# Patient Record
Sex: MALE | Race: WHITE | NOT HISPANIC OR LATINO | Employment: OTHER | URBAN - METROPOLITAN AREA
[De-identification: names, ages, dates, MRNs, and addresses within clinical notes are randomized per-mention and may not be internally consistent; named-entity substitution may affect disease eponyms.]

---

## 2018-01-10 ENCOUNTER — OFFICE VISIT (OUTPATIENT)
Dept: CARDIOLOGY | Facility: CLINIC | Age: 83
End: 2018-01-10
Attending: INTERNAL MEDICINE
Payer: MEDICARE

## 2018-01-10 VITALS
BODY MASS INDEX: 21.76 KG/M2 | HEART RATE: 74 BPM | SYSTOLIC BLOOD PRESSURE: 121 MMHG | WEIGHT: 152 LBS | DIASTOLIC BLOOD PRESSURE: 69 MMHG | HEIGHT: 70 IN

## 2018-01-10 DIAGNOSIS — I25.10 CORONARY ARTERY DISEASE INVOLVING NATIVE CORONARY ARTERY OF NATIVE HEART WITHOUT ANGINA PECTORIS: ICD-10-CM

## 2018-01-10 DIAGNOSIS — I45.10 RIGHT BUNDLE BRANCH BLOCK: ICD-10-CM

## 2018-01-10 DIAGNOSIS — R26.9 GAIT ABNORMALITY: ICD-10-CM

## 2018-01-10 DIAGNOSIS — E78.00 HYPERCHOLESTEROLEMIA: ICD-10-CM

## 2018-01-10 DIAGNOSIS — Z95.1 HISTORY OF CORONARY ARTERY BYPASS SURGERY: ICD-10-CM

## 2018-01-10 DIAGNOSIS — I05.9 MITRAL VALVE DISEASE: ICD-10-CM

## 2018-01-10 PROCEDURE — 93000 ELECTROCARDIOGRAM COMPLETE: CPT | Mod: S$GLB,,, | Performed by: INTERNAL MEDICINE

## 2018-01-10 PROCEDURE — 99214 OFFICE O/P EST MOD 30 MIN: CPT | Mod: 25,S$GLB,, | Performed by: INTERNAL MEDICINE

## 2018-01-10 RX ORDER — ASPIRIN 81 MG/1
81 TABLET ORAL DAILY
Qty: 90 TABLET | Refills: 3 | COMMUNITY
Start: 2018-01-10 | End: 2018-07-12 | Stop reason: SDUPTHER

## 2018-01-10 RX ORDER — ATORVASTATIN CALCIUM 40 MG/1
40 TABLET, FILM COATED ORAL DAILY
Qty: 90 TABLET | Refills: 3 | Status: SHIPPED | OUTPATIENT
Start: 2018-01-10 | End: 2018-07-12 | Stop reason: SDUPTHER

## 2018-01-10 NOTE — PROGRESS NOTES
Subjective:     Chris Vallecillo Jr. is a 82 y.o. male with hypercholesterolemia. He has known coronary artery disease in that he underwent coronary artery bypass surgery in 2003 in [place] while living in [place] receiving three grafts. He was on a ACE inhibitor but that was stopped due to a low blood pressure. 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 He has developed a movement disorder and was on Levodopa that he stopped as he was begun on methylphenidate and with that his gait got a lot better. Now able to bike on the [place] again. Uses a helmet. No falls or crashes. No exertional chest pain or exertional dyspnea. No palpitations or weak spells. No bleeding. Feeling well overall.    Coronary Artery Disease   Presents for follow-up visit. The disease course has been stable. Pertinent negatives include no chest pain, chest pressure, chest tightness, dizziness, leg swelling, muscle weakness, palpitations, shortness of breath or weight gain. Risk factors include hyperlipidemia. Risk factors do not include decreased physical activity, diabetes, hypertension or obesity. The symptoms have been stable.   Hyperlipidemia   This is a chronic problem. The current episode started more than 1 year ago. The problem is controlled. Recent lipid tests were reviewed and are normal. He has no history of chronic renal disease, diabetes, hypothyroidism, liver disease, obesity or nephrotic syndrome. Pertinent negatives include no chest pain, focal sensory loss, focal weakness, leg pain, myalgias or shortness of breath.       Review of Systems   Constitution: Negative for fever and weight gain.   HENT: Negative for nosebleeds.    Eyes: Negative for pain, vision loss in left eye, vision loss in right eye and visual disturbance.   Cardiovascular: Negative for chest pain, claudication, dyspnea on exertion, irregular heartbeat, leg  swelling, near-syncope, orthopnea, palpitations, paroxysmal nocturnal dyspnea and syncope.   Respiratory: Negative for chest tightness, cough, hemoptysis, shortness of breath and wheezing.    Endocrine: Negative for cold intolerance and heat intolerance.   Hematologic/Lymphatic: Negative for bleeding problem. Does not bruise/bleed easily.   Skin: Negative for color change and rash.   Musculoskeletal: Positive for arthritis. Negative for back pain, falls, muscle weakness and myalgias.   Gastrointestinal: Negative for heartburn, hematemesis, hematochezia, hemorrhoids, jaundice, melena, nausea and vomiting.   Genitourinary: Negative for dysuria and hematuria.   Neurological: Positive for disturbances in coordination. Negative for excessive daytime sleepiness, dizziness, focal weakness, headaches, light-headedness, loss of balance, numbness and vertigo.   Psychiatric/Behavioral: Negative for altered mental status, depression and memory loss. The patient is not nervous/anxious.    Allergic/Immunologic: Negative for hives and persistent infections.       Current Outpatient Prescriptions on File Prior to Visit   Medication Sig Dispense Refill    aspirin (ECOTRIN) 81 MG EC tablet Take 1 tablet (81 mg total) by mouth once daily. 90 tablet 3    atorvastatin (LIPITOR) 40 MG tablet Take 1 tablet (40 mg total) by mouth once daily. 90 tablet 3    methylphenidate (RITALIN) 10 MG tablet Take 10 mg by mouth 2 (two) times daily.      moxifloxacin (VIGAMOX) 0.5 % ophthalmic solution Place 1 drop into the left eye 4 (four) times daily. 0.0667 mL 0    moxifloxacin (VIGAMOX) 0.5 % ophthalmic solution Place 1 drop into the right eye 4 (four) times daily. 0.0667 mL 0    penicillin v potassium (VEETID) 500 MG tablet       prednisoLONE acetate (PRED FORTE) 1 % DrpS Place 1 drop into the left eye 4 (four) times daily. 1 Bottle 0    prednisoLONE acetate (PRED FORTE) 1 % DrpS Place 1 drop into the right eye 4 (four) times daily. 1  "Bottle 1    vitamins  A,C,E-zinc-copper 14,320-226-200 unit-mg-unit Cap Take by mouth.       No current facility-administered medications on file prior to visit.        /69   Pulse 74   Ht 5' 10" (1.778 m)   Wt 68.9 kg (152 lb)   BMI 21.81 kg/m²       Objective:     Physical Exam   Constitutional: He appears well-developed and well-nourished.  Non-toxic appearance. He does not appear ill. No distress.   HENT:   Head: Normocephalic and atraumatic.   Nose: Nose normal.   Eyes: Right eye exhibits no discharge. Left eye exhibits no discharge. Right conjunctiva is not injected. Left conjunctiva is not injected. Right pupil is round. Left pupil is round. Pupils are equal.   Neck: Neck supple. No JVD present. Carotid bruit is not present. No thyroid mass and no thyromegaly present.   Cardiovascular: Normal rate, regular rhythm, S1 normal and S2 normal.   No extrasystoles are present. PMI is not displaced.  Exam reveals no gallop, no S3 and no S4.    Murmur heard.   Crescendo-decrescendo mid to late systolic murmur is present with a grade of 2/6  at the apex  Pulses:       Radial pulses are 2+ on the right side, and 2+ on the left side.        Dorsalis pedis pulses are 2+ on the right side, and 2+ on the left side.        Posterior tibial pulses are 2+ on the right side, and 2+ on the left side.   Pulmonary/Chest: Effort normal and breath sounds normal.   Abdominal: Soft. Normal appearance. There is no hepatosplenomegaly. There is no tenderness.   Musculoskeletal:        Right ankle: He exhibits no swelling, no ecchymosis and no deformity.        Left ankle: He exhibits no swelling.   Lymphadenopathy:        Head (right side): No submandibular adenopathy present.        Head (left side): No submandibular adenopathy present.     He has no cervical adenopathy.   Neurological: He is alert. He is not disoriented. No cranial nerve deficit or sensory deficit. Gait abnormal.   Skin: Skin is warm, dry and intact. No rash " noted. He is not diaphoretic. No cyanosis. Nails show no clubbing.   Psychiatric: He has a normal mood and affect. His speech is normal and behavior is normal. Judgment and thought content normal. Cognition and memory are normal.       Assessment:      1. Coronary artery disease involving native coronary artery of native heart without angina pectoris    2. History of coronary artery bypass surgery    3. Mitral valve disease    4. Right bundle branch block    5. Hypercholesterolemia    6. Gait abnormality        Plan:     1. Coronary Artery Disease   5/2003: [place]: CABG x 3.   2/2014: Episode of chest pain that sounded atypical.   On aspirin 81 mg Q24.   Stable.    2. Mitral Valve Disease   6/5/2012: Echo: Normal LV size and function. 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   6/19/2014: Echo: Mildly dilated LV with normal systolic function. Mild aortic valve sclerosis. Mild AR. Mild myxomatous mitral valve disease. Mild MR.   No need for f/u.   Well compensated.    3. Right Bundle Branch Block   3/11/2015: Noted.    4. Hypercholesterolemia   2003: Began statin.   On atorvastatin 40 mg Q24.   3/13/2013: LDL 80.   9/15/2017: Chol 169. HDL 70. LDL 90. TG 47.   On atorvastatin 40 mg Q24.   Well controlled.    5. Movement Disorder   2015: Developed gait disorder.   On methylphenidate 10 mg Q24.   Much improved.    6. Primary Care   [facility].    F/u 6 months.    Brijesh Cole M.D.

## 2018-07-12 ENCOUNTER — OFFICE VISIT (OUTPATIENT)
Dept: CARDIOLOGY | Facility: CLINIC | Age: 83
End: 2018-07-12
Attending: INTERNAL MEDICINE
Payer: MEDICARE

## 2018-07-12 VITALS
DIASTOLIC BLOOD PRESSURE: 60 MMHG | BODY MASS INDEX: 21.33 KG/M2 | HEART RATE: 76 BPM | HEIGHT: 70 IN | WEIGHT: 149 LBS | SYSTOLIC BLOOD PRESSURE: 111 MMHG

## 2018-07-12 DIAGNOSIS — E78.00 HYPERCHOLESTEROLEMIA: ICD-10-CM

## 2018-07-12 DIAGNOSIS — I25.10 CORONARY ARTERY DISEASE INVOLVING NATIVE CORONARY ARTERY OF NATIVE HEART WITHOUT ANGINA PECTORIS: ICD-10-CM

## 2018-07-12 DIAGNOSIS — R26.9 GAIT ABNORMALITY: ICD-10-CM

## 2018-07-12 DIAGNOSIS — I05.9 MITRAL VALVE DISEASE: ICD-10-CM

## 2018-07-12 DIAGNOSIS — Z95.1 HISTORY OF CORONARY ARTERY BYPASS SURGERY: ICD-10-CM

## 2018-07-12 DIAGNOSIS — I45.10 RIGHT BUNDLE BRANCH BLOCK: ICD-10-CM

## 2018-07-12 PROCEDURE — 99214 OFFICE O/P EST MOD 30 MIN: CPT | Mod: S$GLB,,, | Performed by: INTERNAL MEDICINE

## 2018-07-12 RX ORDER — ATORVASTATIN CALCIUM 40 MG/1
40 TABLET, FILM COATED ORAL DAILY
Qty: 90 TABLET | Refills: 3 | Status: SHIPPED | OUTPATIENT
Start: 2018-07-12 | End: 2019-09-03 | Stop reason: SDUPTHER

## 2018-07-12 RX ORDER — ASPIRIN 81 MG/1
81 TABLET ORAL DAILY
Qty: 90 TABLET | Refills: 3 | COMMUNITY
Start: 2018-07-12 | End: 2019-09-03 | Stop reason: SDUPTHER

## 2018-07-12 NOTE — PROGRESS NOTES
Subjective:     Chris Vallecillo Jr. is a 83 y.o. male with hypercholesterolemia. He has known coronary artery disease in that he underwent coronary artery bypass surgery in 2003 in [place] while living in [place] receiving three grafts. He was on a ACE inhibitor but that was stopped due to a low blood pressure. 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 He has developed a movement disorder and was on Levodopa that he stopped as he was begun on methylphenidate and with that his gait got a lot better. No longer able to bike. No falls. No exertional chest pain or exertional dyspnea. No palpitations or weak spells. No bleeding. Feeling well overall.    Coronary Artery Disease   Presents for follow-up visit. The disease course has been stable. Pertinent negatives include no chest pain, chest pressure, chest tightness, dizziness, leg swelling, muscle weakness, palpitations, shortness of breath or weight gain. Risk factors include hyperlipidemia. Risk factors do not include decreased physical activity, diabetes, hypertension or obesity. The symptoms have been stable.   Hyperlipidemia   This is a chronic problem. The current episode started more than 1 year ago. The problem is controlled. Recent lipid tests were reviewed and are normal. He has no history of chronic renal disease, diabetes, hypothyroidism, liver disease, obesity or nephrotic syndrome. Pertinent negatives include no chest pain, focal sensory loss, focal weakness, leg pain, myalgias or shortness of breath.       Review of Systems   Constitution: Negative for fever and weight gain.   HENT: Negative for nosebleeds.    Eyes: Negative for pain, vision loss in left eye and vision loss in right eye.   Cardiovascular: Negative for chest pain, claudication, dyspnea on exertion, irregular heartbeat, leg swelling, near-syncope, orthopnea, palpitations,  paroxysmal nocturnal dyspnea and syncope.   Respiratory: Negative for chest tightness, cough, hemoptysis, shortness of breath and wheezing.    Endocrine: Negative for cold intolerance and heat intolerance.   Hematologic/Lymphatic: Negative for bleeding problem. Does not bruise/bleed easily.   Skin: Negative for color change and rash.   Musculoskeletal: Positive for arthritis. Negative for back pain, falls, muscle weakness and myalgias.   Gastrointestinal: Negative for heartburn, hematemesis, hematochezia, hemorrhoids, jaundice, melena, nausea and vomiting.   Genitourinary: Negative for dysuria and hematuria.   Neurological: Positive for disturbances in coordination. Negative for excessive daytime sleepiness, dizziness, focal weakness, headaches, light-headedness, loss of balance, numbness and vertigo.   Psychiatric/Behavioral: Negative for altered mental status, depression and memory loss. The patient is not nervous/anxious.    Allergic/Immunologic: Negative for hives and persistent infections.       Current Outpatient Prescriptions on File Prior to Visit   Medication Sig Dispense Refill    aspirin (ECOTRIN) 81 MG EC tablet Take 1 tablet (81 mg total) by mouth once daily. 90 tablet 3    atorvastatin (LIPITOR) 40 MG tablet Take 1 tablet (40 mg total) by mouth once daily. 90 tablet 3    methylphenidate (RITALIN) 10 MG tablet Take 10 mg by mouth 2 (two) times daily.      moxifloxacin (VIGAMOX) 0.5 % ophthalmic solution Place 1 drop into the left eye 4 (four) times daily. 0.0667 mL 0    moxifloxacin (VIGAMOX) 0.5 % ophthalmic solution Place 1 drop into the right eye 4 (four) times daily. 0.0667 mL 0    penicillin v potassium (VEETID) 500 MG tablet       prednisoLONE acetate (PRED FORTE) 1 % DrpS Place 1 drop into the left eye 4 (four) times daily. 1 Bottle 0    prednisoLONE acetate (PRED FORTE) 1 % DrpS Place 1 drop into the right eye 4 (four) times daily. 1 Bottle 1    vitamins  A,C,E-zinc-copper  "14,320-226-200 unit-mg-unit Cap Take by mouth.       No current facility-administered medications on file prior to visit.        /60   Pulse 76   Ht 5' 10" (1.778 m)   Wt 67.6 kg (149 lb)   BMI 21.38 kg/m²       Objective:     Physical Exam   Constitutional: He appears well-developed and well-nourished.  Non-toxic appearance. He does not appear ill. No distress.   HENT:   Head: Normocephalic and atraumatic.   Nose: Nose normal.   Eyes: Right eye exhibits no discharge. Left eye exhibits no discharge. Right conjunctiva is not injected. Left conjunctiva is not injected. Right pupil is round. Left pupil is round. Pupils are equal.   Neck: Neck supple. No JVD present. Carotid bruit is not present. No thyroid mass and no thyromegaly present.   Cardiovascular: Normal rate, regular rhythm, S1 normal and S2 normal.   No extrasystoles are present. PMI is not displaced.  Exam reveals no gallop, no S3 and no S4.    Murmur heard.  High-pitched blowing holosystolic murmur is present with a grade of 2/6  at the apex  Pulses:       Radial pulses are 2+ on the right side, and 2+ on the left side.        Dorsalis pedis pulses are 2+ on the right side, and 2+ on the left side.        Posterior tibial pulses are 2+ on the right side, and 2+ on the left side.   Pulmonary/Chest: Effort normal and breath sounds normal.   Abdominal: Soft. Normal appearance. There is no hepatosplenomegaly. There is no tenderness.   Musculoskeletal:        Right ankle: He exhibits no swelling, no ecchymosis and no deformity.        Left ankle: He exhibits no swelling.   Lymphadenopathy:        Head (right side): No submandibular adenopathy present.        Head (left side): No submandibular adenopathy present.     He has no cervical adenopathy.   Neurological: He is alert. He is not disoriented. No cranial nerve deficit. Gait abnormal.   Skin: Skin is warm, dry and intact. No rash noted. He is not diaphoretic. Nails show no clubbing.   Psychiatric: " He has a normal mood and affect. His speech is normal and behavior is normal. Judgment and thought content normal. Cognition and memory are normal.       Assessment:      1. Coronary artery disease involving native coronary artery of native heart without angina pectoris    2. History of coronary artery bypass surgery    3. Mitral valve disease    4. Right bundle branch block    5. Hypercholesterolemia    6. Gait abnormality        Plan:     1. Coronary Artery Disease   5/2003: [place]: CABG x 3.   2/2014: Episode of chest pain that sounded atypical.   On aspirin 81 mg Q24.   Stable.    2. Mitral Valve Disease   6/5/2012: Echo: Normal LV size and function. 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   6/19/2014: Echo: Mildly dilated LV with normal systolic function. Mild aortic valve sclerosis. Mild AR. Mild myxomatous mitral valve disease. Mild MR.   Well compensated.   No need for f/u.    3. Right Bundle Branch Block   3/11/2015: Noted.    4. Hypercholesterolemia   2003: Began statin.   On atorvastatin 40 mg Q24.   3/13/2013: LDL 80.   9/15/2017: Chol 169. HDL 70. LDL 90. TG 47.   On atorvastatin 40 mg Q24.   Well controlled.    5. Movement Disorder   2015: Developed gait disorder.   On methylphenidate 10 mg Q24.   Much improved.    6. Primary Care   [facility].    F/u 6 months.    Brijesh Cole M.D.

## 2018-11-27 ENCOUNTER — TELEPHONE (OUTPATIENT)
Dept: ORTHOPEDICS | Facility: CLINIC | Age: 83
End: 2018-11-27

## 2018-11-27 NOTE — TELEPHONE ENCOUNTER
Spoke to pt, informed he scheduled an appointment with Dr. Retana for toe overlap. Dr. Retana does not see feet, only knees and hips. Pt states he thinks his wife made the appointment. He states he is seeing his neurologist next tues and will be more coherent after the appointment. Informed pt I will cancel the appointment with Dr. Retana. Pt verbalized understanding. Informed pt I can help him schedule with another provider, pt declines stating he does not need an appointment at this time. Informed pt to call in the future if needed. Pt verbalized understanding

## 2019-01-10 ENCOUNTER — OFFICE VISIT (OUTPATIENT)
Dept: CARDIOLOGY | Facility: CLINIC | Age: 84
End: 2019-01-10
Attending: INTERNAL MEDICINE
Payer: MEDICARE

## 2019-01-10 VITALS
WEIGHT: 147 LBS | BODY MASS INDEX: 23.07 KG/M2 | SYSTOLIC BLOOD PRESSURE: 117 MMHG | HEART RATE: 73 BPM | HEIGHT: 67 IN | DIASTOLIC BLOOD PRESSURE: 59 MMHG

## 2019-01-10 DIAGNOSIS — I05.9 MITRAL VALVE DISEASE: ICD-10-CM

## 2019-01-10 DIAGNOSIS — Z95.1 HISTORY OF CORONARY ARTERY BYPASS SURGERY: ICD-10-CM

## 2019-01-10 DIAGNOSIS — R26.9 GAIT ABNORMALITY: ICD-10-CM

## 2019-01-10 DIAGNOSIS — E78.00 HYPERCHOLESTEROLEMIA: ICD-10-CM

## 2019-01-10 DIAGNOSIS — I25.10 CORONARY ARTERY DISEASE INVOLVING NATIVE CORONARY ARTERY OF NATIVE HEART WITHOUT ANGINA PECTORIS: ICD-10-CM

## 2019-01-10 DIAGNOSIS — I45.10 RIGHT BUNDLE BRANCH BLOCK: ICD-10-CM

## 2019-01-10 PROCEDURE — 99214 OFFICE O/P EST MOD 30 MIN: CPT | Mod: S$GLB,,, | Performed by: INTERNAL MEDICINE

## 2019-01-10 PROCEDURE — 99214 PR OFFICE/OUTPT VISIT, EST, LEVL IV, 30-39 MIN: ICD-10-PCS | Mod: S$GLB,,, | Performed by: INTERNAL MEDICINE

## 2019-01-10 RX ORDER — LEVETIRACETAM 250 MG/1
250 TABLET ORAL 2 TIMES DAILY
COMMUNITY

## 2019-01-10 NOTE — PROGRESS NOTES
Subjective:     Chris Vallecillo Jr. is a 83 y.o. male with hypercholesterolemia. He has known coronary artery disease in that he underwent coronary artery bypass surgery in 2003 in [place] while living in [place] receiving three grafts. He was on a ACE inhibitor but that was stopped due to a low blood pressure. 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 He has developed a movement disorder and was on Levodopa that he stopped as he was begun on methylphenidate and with that his gait got a lot better. Still able to bike although his family is trying to get him to walk instead. No falls. No exertional chest pain or exertional dyspnea. No palpitations or weak spells. No bleeding. Feeling well overall.      Coronary Artery Disease   Presents for follow-up visit. The disease course has been stable. Pertinent negatives include no chest pain, chest pressure, chest tightness, dizziness, leg swelling, muscle weakness, palpitations, shortness of breath or weight gain. Risk factors include hyperlipidemia. Risk factors do not include decreased physical activity, diabetes, hypertension or obesity. The symptoms have been stable.   Hyperlipidemia   This is a chronic problem. The current episode started more than 1 year ago. The problem is controlled. Recent lipid tests were reviewed and are normal. He has no history of chronic renal disease, diabetes, hypothyroidism, liver disease, obesity or nephrotic syndrome. Pertinent negatives include no chest pain, focal sensory loss, focal weakness, leg pain, myalgias or shortness of breath.       Review of Systems   Constitution: Negative for fever and weight gain.   HENT: Negative for nosebleeds.    Eyes: Negative for pain, vision loss in left eye and vision loss in right eye.   Cardiovascular: Negative for chest pain, claudication, dyspnea on exertion, irregular heartbeat, leg  swelling, near-syncope, orthopnea, palpitations, paroxysmal nocturnal dyspnea and syncope.   Respiratory: Negative for chest tightness, cough, hemoptysis, shortness of breath and wheezing.    Endocrine: Negative for cold intolerance and heat intolerance.   Hematologic/Lymphatic: Negative for bleeding problem. Does not bruise/bleed easily.   Skin: Negative for color change and rash.   Musculoskeletal: Positive for arthritis. Negative for back pain, falls, muscle weakness and myalgias.   Gastrointestinal: Negative for heartburn, hematemesis, hematochezia, hemorrhoids, jaundice, melena, nausea and vomiting.   Genitourinary: Negative for dysuria and hematuria.   Neurological: Positive for disturbances in coordination. Negative for excessive daytime sleepiness, dizziness, focal weakness, headaches, light-headedness, loss of balance, numbness and vertigo.   Psychiatric/Behavioral: Negative for altered mental status, depression and memory loss. The patient is not nervous/anxious.    Allergic/Immunologic: Negative for hives and persistent infections.       Current Outpatient Medications on File Prior to Visit   Medication Sig Dispense Refill    aspirin (ECOTRIN) 81 MG EC tablet Take 1 tablet (81 mg total) by mouth once daily. 90 tablet 3    atorvastatin (LIPITOR) 40 MG tablet Take 1 tablet (40 mg total) by mouth once daily. 90 tablet 3    levETIRAcetam (KEPPRA) 250 MG Tab Take 250 mg by mouth 2 (two) times daily.      methylphenidate (RITALIN) 10 MG tablet Take 10 mg by mouth 2 (two) times daily.      moxifloxacin (VIGAMOX) 0.5 % ophthalmic solution Place 1 drop into the left eye 4 (four) times daily. 0.0667 mL 0    moxifloxacin (VIGAMOX) 0.5 % ophthalmic solution Place 1 drop into the right eye 4 (four) times daily. 0.0667 mL 0    penicillin v potassium (VEETID) 500 MG tablet       prednisoLONE acetate (PRED FORTE) 1 % DrpS Place 1 drop into the left eye 4 (four) times daily. 1 Bottle 0    prednisoLONE acetate  "(PRED FORTE) 1 % DrpS Place 1 drop into the right eye 4 (four) times daily. 1 Bottle 1    vitamins  A,C,E-zinc-copper 14,320-226-200 unit-mg-unit Cap Take by mouth.       No current facility-administered medications on file prior to visit.        BP (!) 117/59   Pulse 73   Ht 5' 7" (1.702 m)   Wt 66.7 kg (147 lb)   BMI 23.02 kg/m²       Objective:     Physical Exam   Constitutional: He appears well-developed and well-nourished.  Non-toxic appearance. He does not appear ill. No distress.   HENT:   Head: Normocephalic and atraumatic.   Nose: Nose normal.   Eyes: Right eye exhibits no discharge. Left eye exhibits no discharge. Right conjunctiva is not injected. Left conjunctiva is not injected. Right pupil is round. Left pupil is round. Pupils are equal.   Neck: Neck supple. No JVD present. Carotid bruit is not present. No thyroid mass and no thyromegaly present.   Cardiovascular: Normal rate, regular rhythm, S1 normal and S2 normal.  No extrasystoles are present. PMI is not displaced. Exam reveals no gallop, no S3 and no S4.   Murmur heard.  High-pitched blowing holosystolic murmur is present with a grade of 2/6 at the apex.  Pulses:       Radial pulses are 2+ on the right side, and 2+ on the left side.        Dorsalis pedis pulses are 2+ on the right side, and 2+ on the left side.        Posterior tibial pulses are 2+ on the right side, and 2+ on the left side.   Pulmonary/Chest: Effort normal and breath sounds normal.   Median sternotomy scar.   Abdominal: Soft. Normal appearance. There is no hepatosplenomegaly. There is no tenderness.   Musculoskeletal:        Right ankle: He exhibits no swelling, no ecchymosis and no deformity.        Left ankle: He exhibits no swelling.   Lymphadenopathy:        Head (right side): No submandibular adenopathy present.        Head (left side): No submandibular adenopathy present.     He has no cervical adenopathy.   Neurological: He is alert. He is not disoriented. No cranial " nerve deficit. Gait abnormal.   Skin: Skin is warm, dry and intact. No rash noted. He is not diaphoretic.   Psychiatric: He has a normal mood and affect. His speech is normal and behavior is normal. Judgment and thought content normal. Cognition and memory are normal.       Assessment:      1. Coronary artery disease involving native coronary artery of native heart without angina pectoris    2. History of coronary artery bypass surgery    3. Mitral valve disease    4. Right bundle branch block    5. Hypercholesterolemia    6. Gait abnormality        Plan:     1. Coronary Artery Disease   5/2003: [place]: CABG x 3.   2/2014: Episode of chest pain that sounded atypical.   On aspirin 81 mg Q24.   Stable.    2. Mitral Valve Disease   6/5/2012: Echo: Normal LV size and function. 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   6/19/2014: Echo: Mildly dilated LV with normal systolic function. Mild aortic valve sclerosis. Mild AR. Mild myxomatous mitral valve disease. Mild MR.   Well compensated.   No need for f/u.    3. Right Bundle Branch Block   3/11/2015: Noted.    4. Hypercholesterolemia   2003: Began statin.   On atorvastatin 40 mg Q24.   3/13/2013: LDL 80.   9/15/2017: Chol 169. HDL 70. LDL 90. TG 47.   On atorvastatin 40 mg Q24.   Well controlled.    5. Movement Disorder   2015: Developed gait disorder.   On methylphenidate 10 mg Q24 and levetiracetam 250 mg Q12.   Much improved.    6. Primary Care   [facility].    F/u 6 months.    Brijesh Cole M.D.

## 2019-09-03 ENCOUNTER — OFFICE VISIT (OUTPATIENT)
Dept: CARDIOLOGY | Facility: CLINIC | Age: 84
End: 2019-09-03
Attending: INTERNAL MEDICINE
Payer: MEDICARE

## 2019-09-03 VITALS
HEART RATE: 79 BPM | BODY MASS INDEX: 22.44 KG/M2 | WEIGHT: 143 LBS | DIASTOLIC BLOOD PRESSURE: 65 MMHG | HEIGHT: 67 IN | SYSTOLIC BLOOD PRESSURE: 115 MMHG

## 2019-09-03 DIAGNOSIS — I45.10 RIGHT BUNDLE BRANCH BLOCK: ICD-10-CM

## 2019-09-03 DIAGNOSIS — Z95.1 HISTORY OF CORONARY ARTERY BYPASS SURGERY: ICD-10-CM

## 2019-09-03 DIAGNOSIS — R26.9 GAIT ABNORMALITY: ICD-10-CM

## 2019-09-03 DIAGNOSIS — I05.9 MITRAL VALVE DISEASE: ICD-10-CM

## 2019-09-03 DIAGNOSIS — I25.10 CORONARY ARTERY DISEASE INVOLVING NATIVE CORONARY ARTERY OF NATIVE HEART WITHOUT ANGINA PECTORIS: ICD-10-CM

## 2019-09-03 DIAGNOSIS — E78.00 HYPERCHOLESTEROLEMIA: ICD-10-CM

## 2019-09-03 PROCEDURE — 99214 OFFICE O/P EST MOD 30 MIN: CPT | Mod: S$GLB,,, | Performed by: INTERNAL MEDICINE

## 2019-09-03 PROCEDURE — 99214 PR OFFICE/OUTPT VISIT, EST, LEVL IV, 30-39 MIN: ICD-10-PCS | Mod: S$GLB,,, | Performed by: INTERNAL MEDICINE

## 2019-09-03 RX ORDER — ASPIRIN 81 MG/1
81 TABLET ORAL DAILY
Qty: 90 TABLET | Refills: 3 | COMMUNITY
Start: 2019-09-03 | End: 2020-03-03 | Stop reason: SDUPTHER

## 2019-09-03 RX ORDER — ATORVASTATIN CALCIUM 40 MG/1
40 TABLET, FILM COATED ORAL DAILY
Qty: 90 TABLET | Refills: 3 | Status: SHIPPED | OUTPATIENT
Start: 2019-09-03 | End: 2020-01-09

## 2019-09-03 NOTE — PROGRESS NOTES
Subjective:     Chris Vallecillo Jr. is a 84 y.o. male with hypercholesterolemia. He has known coronary artery disease in that he underwent coronary artery bypass surgery in 2003 in [place] while living in [place] receiving three grafts. He was on a ACE inhibitor but that was stopped due to a low blood pressure. 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 He has developed a movement disorder and was on Levodopa that he stopped as he was begun on methylphenidate and with that his gait got better. Still able to bike although his family is trying to get him to walk instead. No falls. No exertional chest pain or exertional dyspnea. No palpitations or weak spells. No bleeding. Feeling well overall.      Coronary Artery Disease   Presents for follow-up visit. The disease course has been stable. Pertinent negatives include no chest pain, chest pressure, chest tightness, dizziness, leg swelling, muscle weakness, palpitations, shortness of breath or weight gain. Risk factors include hyperlipidemia. Risk factors do not include decreased physical activity, diabetes, hypertension or obesity. The symptoms have been stable.   Hyperlipidemia   This is a chronic problem. The current episode started more than 1 year ago. The problem is controlled. Recent lipid tests were reviewed and are normal. He has no history of chronic renal disease, diabetes, hypothyroidism, liver disease, obesity or nephrotic syndrome. Pertinent negatives include no chest pain, focal sensory loss, focal weakness, leg pain, myalgias or shortness of breath.       Review of Systems   Constitution: Negative for fever and weight gain.   HENT: Negative for nosebleeds.    Eyes: Negative for pain, vision loss in left eye and vision loss in right eye.   Cardiovascular: Negative for chest pain, claudication, dyspnea on exertion, irregular heartbeat, leg swelling,  near-syncope, orthopnea, palpitations, paroxysmal nocturnal dyspnea and syncope.   Respiratory: Negative for chest tightness, cough, hemoptysis, shortness of breath and wheezing.    Endocrine: Negative for cold intolerance and heat intolerance.   Hematologic/Lymphatic: Negative for bleeding problem. Does not bruise/bleed easily.   Skin: Negative for color change and rash.   Musculoskeletal: Negative for arthritis, back pain, falls, muscle weakness and myalgias.   Gastrointestinal: Negative for heartburn, hematemesis, hematochezia, hemorrhoids, jaundice, melena, nausea and vomiting.   Genitourinary: Negative for dysuria and hematuria.   Neurological: Positive for disturbances in coordination. Negative for excessive daytime sleepiness, dizziness, focal weakness, headaches, light-headedness, loss of balance, numbness and vertigo.   Psychiatric/Behavioral: Negative for altered mental status, depression and memory loss. The patient is not nervous/anxious.    Allergic/Immunologic: Negative for hives and persistent infections.       Current Outpatient Medications on File Prior to Visit   Medication Sig Dispense Refill    aspirin (ECOTRIN) 81 MG EC tablet Take 1 tablet (81 mg total) by mouth once daily. 90 tablet 3    atorvastatin (LIPITOR) 40 MG tablet Take 1 tablet (40 mg total) by mouth once daily. 90 tablet 3    levETIRAcetam (KEPPRA) 250 MG Tab Take 250 mg by mouth 2 (two) times daily.      methylphenidate (RITALIN) 10 MG tablet Take 10 mg by mouth 2 (two) times daily.      moxifloxacin (VIGAMOX) 0.5 % ophthalmic solution Place 1 drop into the left eye 4 (four) times daily. 0.0667 mL 0    moxifloxacin (VIGAMOX) 0.5 % ophthalmic solution Place 1 drop into the right eye 4 (four) times daily. 0.0667 mL 0    penicillin v potassium (VEETID) 500 MG tablet       prednisoLONE acetate (PRED FORTE) 1 % DrpS Place 1 drop into the left eye 4 (four) times daily. 1 Bottle 0    prednisoLONE acetate (PRED FORTE) 1 % DrpS  "Place 1 drop into the right eye 4 (four) times daily. 1 Bottle 1    vitamins  A,C,E-zinc-copper 14,320-226-200 unit-mg-unit Cap Take by mouth.       No current facility-administered medications on file prior to visit.        /65   Pulse 79   Ht 5' 7" (1.702 m)   Wt 64.9 kg (143 lb)   BMI 22.40 kg/m²       Objective:     Physical Exam   Constitutional: He appears well-developed and well-nourished.  Non-toxic appearance. He does not appear ill. No distress.   HENT:   Head: Normocephalic and atraumatic.   Nose: Nose normal.   Eyes: Right eye exhibits no discharge. Left eye exhibits no discharge. Right conjunctiva is not injected. Left conjunctiva is not injected. Right pupil is round. Left pupil is round. Pupils are equal.   Neck: Neck supple. No JVD present. Carotid bruit is not present. No thyroid mass and no thyromegaly present.   Cardiovascular: Normal rate, regular rhythm, S1 normal and S2 normal.  No extrasystoles are present. PMI is not displaced. Exam reveals no gallop, no S3 and no S4.   Murmur heard.  High-pitched blowing holosystolic murmur is present with a grade of 3/6 at the apex.  Pulses:       Radial pulses are 2+ on the right side, and 2+ on the left side.        Dorsalis pedis pulses are 2+ on the right side, and 2+ on the left side.        Posterior tibial pulses are 2+ on the right side, and 2+ on the left side.   Pulmonary/Chest: Effort normal and breath sounds normal.   Median sternotomy scar.   Abdominal: Soft. Normal appearance. There is no hepatosplenomegaly. There is no tenderness.   Musculoskeletal:        Right ankle: He exhibits no swelling, no ecchymosis and no deformity.        Left ankle: He exhibits no swelling.   Lymphadenopathy:        Head (right side): No submandibular adenopathy present.        Head (left side): No submandibular adenopathy present.     He has no cervical adenopathy.   Neurological: He is alert. He is not disoriented. No cranial nerve deficit. Gait " abnormal.   Skin: Skin is warm, dry and intact. No rash noted. He is not diaphoretic.   Psychiatric: He has a normal mood and affect. His speech is normal and behavior is normal. Judgment and thought content normal. Cognition and memory are normal.       Assessment:      1. Coronary artery disease involving native coronary artery of native heart without angina pectoris    2. History of coronary artery bypass surgery    3. Mitral valve disease    4. Right bundle branch block    5. Hypercholesterolemia    6. Gait abnormality        Plan:     1. Coronary Artery Disease   5/2003: [place]: CABG x 3.   2/2014: Episode of chest pain that sounded atypical.   On aspirin 81 mg Q24.   Stable.    2. Mitral Valve Disease   6/5/2012: Echo: Normal LV size and function. 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   6/19/2014: Echo: Mildly dilated LV with normal systolic function. Mild aortic valve sclerosis. Mild AR. Mild myxomatous mitral valve disease. Mild MR.   Well compensated.   No need for f/u.    3. Right Bundle Branch Block   3/11/2015: Noted.    4. Hypercholesterolemia   2003: Began statin.   On atorvastatin 40 mg Q24.   3/13/2013: LDL 80.   9/15/2017: Chol 169. HDL 70. LDL 90. TG 47.   On atorvastatin 40 mg Q24.   Well controlled.    5. Movement Disorder   2015: Developed gait disorder.   On methylphenidate 10 mg Q24 and levetiracetam 250 mg Q12.   Much improved.    6. Primary Care   [facility].    F/u 6 months.    Brijesh Cole M.D.

## 2020-01-09 DIAGNOSIS — I25.10 CORONARY ARTERY DISEASE INVOLVING NATIVE CORONARY ARTERY OF NATIVE HEART WITHOUT ANGINA PECTORIS: ICD-10-CM

## 2020-01-09 DIAGNOSIS — E78.00 HYPERCHOLESTEROLEMIA: ICD-10-CM

## 2020-01-09 RX ORDER — ATORVASTATIN CALCIUM 40 MG/1
TABLET, FILM COATED ORAL
Qty: 30 TABLET | Refills: 11 | Status: SHIPPED | OUTPATIENT
Start: 2020-01-09 | End: 2020-03-03 | Stop reason: SDUPTHER

## 2020-03-03 ENCOUNTER — OFFICE VISIT (OUTPATIENT)
Dept: CARDIOLOGY | Facility: CLINIC | Age: 85
End: 2020-03-03
Attending: INTERNAL MEDICINE
Payer: MEDICARE

## 2020-03-03 VITALS
HEIGHT: 67 IN | WEIGHT: 145 LBS | HEART RATE: 85 BPM | SYSTOLIC BLOOD PRESSURE: 102 MMHG | BODY MASS INDEX: 22.76 KG/M2 | DIASTOLIC BLOOD PRESSURE: 53 MMHG

## 2020-03-03 DIAGNOSIS — I05.9 MITRAL VALVE DISEASE: ICD-10-CM

## 2020-03-03 DIAGNOSIS — I45.10 RIGHT BUNDLE BRANCH BLOCK: ICD-10-CM

## 2020-03-03 DIAGNOSIS — I25.10 CORONARY ARTERY DISEASE INVOLVING NATIVE CORONARY ARTERY OF NATIVE HEART WITHOUT ANGINA PECTORIS: ICD-10-CM

## 2020-03-03 DIAGNOSIS — R26.9 GAIT ABNORMALITY: ICD-10-CM

## 2020-03-03 DIAGNOSIS — E78.00 HYPERCHOLESTEROLEMIA: ICD-10-CM

## 2020-03-03 DIAGNOSIS — Z95.1 HISTORY OF CORONARY ARTERY BYPASS SURGERY: ICD-10-CM

## 2020-03-03 PROCEDURE — 93000 ELECTROCARDIOGRAM COMPLETE: CPT | Mod: S$GLB,,, | Performed by: INTERNAL MEDICINE

## 2020-03-03 PROCEDURE — 99214 OFFICE O/P EST MOD 30 MIN: CPT | Mod: 25,S$GLB,, | Performed by: INTERNAL MEDICINE

## 2020-03-03 PROCEDURE — 93000 PR ELECTROCARDIOGRAM, COMPLETE: ICD-10-PCS | Mod: S$GLB,,, | Performed by: INTERNAL MEDICINE

## 2020-03-03 PROCEDURE — 99214 PR OFFICE/OUTPT VISIT, EST, LEVL IV, 30-39 MIN: ICD-10-PCS | Mod: 25,S$GLB,, | Performed by: INTERNAL MEDICINE

## 2020-03-03 RX ORDER — ASPIRIN 81 MG/1
81 TABLET ORAL DAILY
Qty: 90 TABLET | Refills: 3 | Status: SHIPPED | OUTPATIENT
Start: 2020-03-03 | End: 2020-09-03 | Stop reason: SDUPTHER

## 2020-03-03 RX ORDER — ATORVASTATIN CALCIUM 40 MG/1
40 TABLET, FILM COATED ORAL DAILY
Qty: 90 TABLET | Refills: 3 | Status: SHIPPED | OUTPATIENT
Start: 2020-03-03 | End: 2020-03-06

## 2020-03-03 NOTE — PROGRESS NOTES
Subjective:     Chris Vallecillo Jr. is a 85 y.o. male with hypercholesterolemia. He has known coronary artery disease in that he underwent coronary artery bypass surgery in 2003 in [place] while living in [place] receiving three grafts. He was on a ACE inhibitor but that was stopped due to a low blood pressure. 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 He has developed a movement disorder and was on Levodopa that he stopped as he was begun on methylphenidate and with that his gait got better. He was able to bike until 2019 but since then been walking. No falls. No exertional chest pain or exertional dyspnea. No palpitations or weak spells. No bleeding. Feeling well overall.      Coronary Artery Disease   Presents for follow-up visit. The disease course has been stable. Pertinent negatives include no chest pain, chest pressure, chest tightness, dizziness, leg swelling, muscle weakness, palpitations, shortness of breath or weight gain. Risk factors include hyperlipidemia. Risk factors do not include decreased physical activity, diabetes, hypertension or obesity. The symptoms have been stable.   Hyperlipidemia   This is a chronic problem. The current episode started more than 1 year ago. The problem is controlled. Recent lipid tests were reviewed and are normal. He has no history of chronic renal disease, diabetes, hypothyroidism, liver disease, obesity or nephrotic syndrome. Pertinent negatives include no chest pain, focal sensory loss, focal weakness, leg pain, myalgias or shortness of breath.       Review of Systems   Constitution: Negative for fever and weight gain.   HENT: Negative for nosebleeds.    Eyes: Negative for pain, vision loss in left eye and vision loss in right eye.   Cardiovascular: Negative for chest pain, claudication, dyspnea on exertion, irregular heartbeat, leg swelling, near-syncope,  orthopnea, palpitations, paroxysmal nocturnal dyspnea and syncope.   Respiratory: Negative for chest tightness, cough, hemoptysis, shortness of breath and wheezing.    Endocrine: Negative for cold intolerance and heat intolerance.   Hematologic/Lymphatic: Negative for bleeding problem. Does not bruise/bleed easily.   Skin: Negative for color change and rash.   Musculoskeletal: Negative for arthritis, back pain, falls, muscle weakness and myalgias.   Gastrointestinal: Negative for heartburn, hematemesis, hematochezia, hemorrhoids, jaundice, melena, nausea and vomiting.   Genitourinary: Negative for dysuria and hematuria.   Neurological: Positive for disturbances in coordination. Negative for excessive daytime sleepiness, dizziness, focal weakness, headaches, light-headedness, loss of balance, numbness and vertigo.   Psychiatric/Behavioral: Negative for altered mental status, depression and memory loss. The patient is not nervous/anxious.    Allergic/Immunologic: Negative for hives and persistent infections.       Current Outpatient Medications on File Prior to Visit   Medication Sig Dispense Refill    aspirin (ECOTRIN) 81 MG EC tablet Take 1 tablet (81 mg total) by mouth once daily. 90 tablet 3    atorvastatin (LIPITOR) 40 MG tablet TAKE 1 TABLET BY MOUTH EVERY DAY 30 tablet 11    levETIRAcetam (KEPPRA) 250 MG Tab Take 250 mg by mouth 2 (two) times daily.      methylphenidate (RITALIN) 10 MG tablet Take 10 mg by mouth 2 (two) times daily.      moxifloxacin (VIGAMOX) 0.5 % ophthalmic solution Place 1 drop into the left eye 4 (four) times daily. 0.0667 mL 0    moxifloxacin (VIGAMOX) 0.5 % ophthalmic solution Place 1 drop into the right eye 4 (four) times daily. 0.0667 mL 0    penicillin v potassium (VEETID) 500 MG tablet       prednisoLONE acetate (PRED FORTE) 1 % DrpS Place 1 drop into the left eye 4 (four) times daily. 1 Bottle 0    prednisoLONE acetate (PRED FORTE) 1 % DrpS Place 1 drop into the right eye 4  "(four) times daily. 1 Bottle 1    vitamins  A,C,E-zinc-copper 14,320-226-200 unit-mg-unit Cap Take by mouth.       No current facility-administered medications on file prior to visit.        BP (!) 102/53   Pulse 85   Ht 5' 7" (1.702 m)   Wt 65.8 kg (145 lb)   BMI 22.71 kg/m²       Objective:     Physical Exam   Constitutional: He appears well-developed and well-nourished.  Non-toxic appearance. He does not appear ill. No distress.   HENT:   Head: Normocephalic and atraumatic.   Nose: Nose normal.   Eyes: Right eye exhibits no discharge. Left eye exhibits no discharge. Right conjunctiva is not injected. Left conjunctiva is not injected. Right pupil is round. Left pupil is round. Pupils are equal.   Neck: Neck supple. No JVD present. Carotid bruit is not present. No thyroid mass and no thyromegaly present.   Cardiovascular: Normal rate, regular rhythm, S1 normal and S2 normal.  No extrasystoles are present. PMI is not displaced. Exam reveals no gallop, no S3 and no S4.   Murmur heard.  High-pitched blowing holosystolic murmur is present with a grade of 3/6 at the apex.  Pulses:       Radial pulses are 2+ on the right side, and 2+ on the left side.        Dorsalis pedis pulses are 2+ on the right side, and 2+ on the left side.        Posterior tibial pulses are 2+ on the right side, and 2+ on the left side.   Pulmonary/Chest: Effort normal and breath sounds normal.   Median sternotomy scar.   Abdominal: Soft. Normal appearance. There is no hepatosplenomegaly. There is no tenderness.   Musculoskeletal:        Right ankle: He exhibits no swelling, no ecchymosis and no deformity.        Left ankle: He exhibits no swelling.   Lymphadenopathy:        Head (right side): No submandibular adenopathy present.        Head (left side): No submandibular adenopathy present.     He has no cervical adenopathy.   Neurological: He is alert. He is not disoriented. No cranial nerve deficit. Gait abnormal.   Skin: Skin is warm, dry " and intact. No rash noted. He is not diaphoretic.   Psychiatric: He has a normal mood and affect. His behavior is normal. Judgment and thought content normal. His speech is delayed. Cognition and memory are normal.       Assessment:      1. Coronary artery disease involving native coronary artery of native heart without angina pectoris    2. History of coronary artery bypass surgery    3. Mitral valve disease    4. Right bundle branch block    5. Hypercholesterolemia    6. Gait abnormality        Plan:     1. Coronary Artery Disease   5/2003: [place]: CABG x 3.   2/2014: Episode of chest pain that sounded atypical.   On aspirin 81 mg Q24.   Stable.    2. Mitral Valve Disease   6/5/2012: Echo: Normal LV size and function. 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   6/19/2014: Echo: Mildly dilated LV with normal systolic function. Mild aortic valve sclerosis. Mild AR. Mild myxomatous mitral valve disease. Mild MR.   Well compensated.   No need for f/u.    3. Right Bundle Branch Block   3/11/2015: Noted.    4. Hypercholesterolemia   2003: Began statin.   On atorvastatin 40 mg Q24.   3/13/2013: LDL 80.   9/15/2017: Chol 169. HDL 70. LDL 90. TG 47.   On atorvastatin 40 mg Q24.   Well controlled.    5. Movement Disorder   2015: Developed gait disorder.   On methylphenidate 10 mg Q24 and levetiracetam 250 mg Q12.   At least initially was much improved.    6. Primary Care   [facility].    F/u 6 months.    Brijesh Cole M.D.

## 2020-03-06 DIAGNOSIS — E78.00 HYPERCHOLESTEROLEMIA: ICD-10-CM

## 2020-03-06 DIAGNOSIS — I25.10 CORONARY ARTERY DISEASE INVOLVING NATIVE CORONARY ARTERY OF NATIVE HEART WITHOUT ANGINA PECTORIS: ICD-10-CM

## 2020-03-06 RX ORDER — ATORVASTATIN CALCIUM 40 MG/1
TABLET, FILM COATED ORAL
Qty: 90 TABLET | Refills: 3 | Status: SHIPPED | OUTPATIENT
Start: 2020-03-06 | End: 2020-09-03 | Stop reason: SDUPTHER

## 2020-03-11 ENCOUNTER — HOSPITAL ENCOUNTER (EMERGENCY)
Facility: HOSPITAL | Age: 85
Discharge: HOME | End: 2020-03-11
Attending: EMERGENCY MEDICINE
Payer: MEDICARE

## 2020-03-11 VITALS
SYSTOLIC BLOOD PRESSURE: 116 MMHG | OXYGEN SATURATION: 95 % | TEMPERATURE: 99 F | DIASTOLIC BLOOD PRESSURE: 56 MMHG | RESPIRATION RATE: 16 BRPM | HEART RATE: 78 BPM

## 2020-03-11 DIAGNOSIS — Z00.00 EVALUATION BY MEDICAL SERVICE REQUIRED: Primary | ICD-10-CM

## 2020-03-11 DIAGNOSIS — R05.9 COUGH: ICD-10-CM

## 2020-03-11 LAB
ADENOVIRUS: NOT DETECTED
ALBUMIN SERPL BCP-MCNC: 3.8 G/DL (ref 3.5–5.2)
ALP SERPL-CCNC: 104 U/L (ref 55–135)
ALT SERPL W/O P-5'-P-CCNC: 55 U/L (ref 10–44)
ANION GAP SERPL CALC-SCNC: 7 MMOL/L (ref 8–16)
AST SERPL-CCNC: 152 U/L (ref 10–40)
BASOPHILS # BLD AUTO: 0.01 K/UL (ref 0–0.2)
BASOPHILS NFR BLD: 0.2 % (ref 0–1.9)
BILIRUB SERPL-MCNC: 0.5 MG/DL (ref 0.1–1)
BORDETELLA PARAPERTUSSIS (IS1001): NOT DETECTED
BORDETELLA PERTUSSIS (PTXP): NOT DETECTED
BUN SERPL-MCNC: 19 MG/DL (ref 8–23)
CALCIUM SERPL-MCNC: 9.3 MG/DL (ref 8.7–10.5)
CHLAMYDIA PNEUMONIAE: NOT DETECTED
CHLORIDE SERPL-SCNC: 105 MMOL/L (ref 95–110)
CO2 SERPL-SCNC: 26 MMOL/L (ref 23–29)
CORONAVIRUS 229E, COMMON COLD VIRUS: NOT DETECTED
CORONAVIRUS HKU1, COMMON COLD VIRUS: NOT DETECTED
CORONAVIRUS NL63, COMMON COLD VIRUS: NOT DETECTED
CORONAVIRUS OC43, COMMON COLD VIRUS: NOT DETECTED
CREAT SERPL-MCNC: 1.1 MG/DL (ref 0.5–1.4)
DIFFERENTIAL METHOD: ABNORMAL
EOSINOPHIL # BLD AUTO: 0 K/UL (ref 0–0.5)
EOSINOPHIL NFR BLD: 0.9 % (ref 0–8)
ERYTHROCYTE [DISTWIDTH] IN BLOOD BY AUTOMATED COUNT: 13.2 % (ref 11.5–14.5)
EST. GFR  (AFRICAN AMERICAN): >60 ML/MIN/1.73 M^2
EST. GFR  (NON AFRICAN AMERICAN): >60 ML/MIN/1.73 M^2
FLUBV RNA NPH QL NAA+NON-PROBE: NOT DETECTED
GLUCOSE SERPL-MCNC: 92 MG/DL (ref 70–110)
HCT VFR BLD AUTO: 47.6 % (ref 40–54)
HGB BLD-MCNC: 15.1 G/DL (ref 14–18)
HPIV1 RNA NPH QL NAA+NON-PROBE: NOT DETECTED
HPIV2 RNA NPH QL NAA+NON-PROBE: NOT DETECTED
HPIV3 RNA NPH QL NAA+NON-PROBE: NOT DETECTED
HPIV4 RNA NPH QL NAA+NON-PROBE: NOT DETECTED
HUMAN METAPNEUMOVIRUS: NOT DETECTED
IMM GRANULOCYTES # BLD AUTO: 0.01 K/UL (ref 0–0.04)
IMM GRANULOCYTES NFR BLD AUTO: 0.2 % (ref 0–0.5)
INFLUENZA A (SUBTYPES H1,H1-2009,H3): NOT DETECTED
INFLUENZA A, MOLECULAR: NEGATIVE
INFLUENZA B, MOLECULAR: NEGATIVE
LYMPHOCYTES # BLD AUTO: 1.1 K/UL (ref 1–4.8)
LYMPHOCYTES NFR BLD: 25 % (ref 18–48)
MCH RBC QN AUTO: 31.5 PG (ref 27–31)
MCHC RBC AUTO-ENTMCNC: 31.7 G/DL (ref 32–36)
MCV RBC AUTO: 99 FL (ref 82–98)
MONOCYTES # BLD AUTO: 0.4 K/UL (ref 0.3–1)
MONOCYTES NFR BLD: 8.3 % (ref 4–15)
MYCOPLASMA PNEUMONIAE: NOT DETECTED
NEUTROPHILS # BLD AUTO: 2.9 K/UL (ref 1.8–7.7)
NEUTROPHILS NFR BLD: 65.4 % (ref 38–73)
NRBC BLD-RTO: 0 /100 WBC
PLATELET # BLD AUTO: 137 K/UL (ref 150–350)
PMV BLD AUTO: 10.3 FL (ref 9.2–12.9)
POTASSIUM SERPL-SCNC: 4.1 MMOL/L (ref 3.5–5.1)
PROT SERPL-MCNC: 7.4 G/DL (ref 6–8.4)
RBC # BLD AUTO: 4.8 M/UL (ref 4.6–6.2)
RESPIRATORY INFECTION PANEL SOURCE: NORMAL
RSV RNA NPH QL NAA+NON-PROBE: NOT DETECTED
RV+EV RNA NPH QL NAA+NON-PROBE: NOT DETECTED
SODIUM SERPL-SCNC: 138 MMOL/L (ref 136–145)
SPECIMEN SOURCE: NORMAL
WBC # BLD AUTO: 4.44 K/UL (ref 3.9–12.7)

## 2020-03-11 PROCEDURE — 87502 INFLUENZA DNA AMP PROBE: CPT

## 2020-03-11 PROCEDURE — 87798 DETECT AGENT NOS DNA AMP: CPT

## 2020-03-11 PROCEDURE — 80053 COMPREHEN METABOLIC PANEL: CPT

## 2020-03-11 PROCEDURE — 99284 EMERGENCY DEPT VISIT MOD MDM: CPT | Mod: ,,, | Performed by: PHYSICIAN ASSISTANT

## 2020-03-11 PROCEDURE — 99284 EMERGENCY DEPT VISIT MOD MDM: CPT | Mod: 25

## 2020-03-11 PROCEDURE — 85025 COMPLETE CBC W/AUTO DIFF WBC: CPT

## 2020-03-11 PROCEDURE — 99284 PR EMERGENCY DEPT VISIT,LEVEL IV: ICD-10-PCS | Mod: ,,, | Performed by: PHYSICIAN ASSISTANT

## 2020-03-11 NOTE — ED TRIAGE NOTES
Pt arrived with wife from [facility]. Pt has not had any symptoms other than muscle aches to buttocks and down bilateral legs. Denies any pain at this time. No SOP, No CP. No Cough. No abd pain. No Headache. Was told to come and get tested per CDC.

## 2020-03-11 NOTE — ED PROVIDER NOTES
Encounter Date: 3/11/2020       History     Chief Complaint   Patient presents with    Generalized Body Aches     from [facility]; had pt in building with coronavirus; recent travel to [place]    Fatigue     10:33 AM  Patient is a 85-year-old male who presents the ED for cough and exposure to novel coronavirus.  Patient is coming from the [facility] in [place] that has a confirmed positive coronavirus case.  He is alert and oriented x3.  He is aware that he came from [facility], but he is unsure why he is here.  He seems to be a poor informant.  He reports several chronic symptoms such as abnormal heart rhythm, weakness, and fatigue. He endorses his typical arthralgias, but denies any chest or abdominal pain. Patient cough during exam and when asked for how long, he could not quite answer the question after asking multiple times.  Denies any sinus congestion, sore throat, urinary symptoms, or diarrhea.  States he bruises easily; is on baby aspirin. Does not smoke or drink.    He recently traveled to [place] to visit family members.  His sick contacts include his wife who has been coughing for about 1 week and the positive confirm case at [facility].          Review of patient's allergies indicates:  No Known Allergies  Past Medical History:   Diagnosis Date    Arthritis     Coronary artery disease 8/29/2013 5/2003: CABG x 3, [place].    Heart attack     Hx of CABG 8/29/2013 5/2003: CABG x 3, [place].    Hypercholesterolemia 8/29/2013    Hyperlipidemia     Mitral valve disorders(424.0) 8/29/2013 6/5/2012: Echo: Normal LV size and function. 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    Movement disorder     Right bundle branch block 3/11/2015    3/11/2015: Noted.    SCC (squamous cell carcinoma)     excised from scalp vertex    Squamous Cell Carcinoma     in situ, excised from R antihelix     Past Surgical History:   Procedure Laterality  Date    mohs surgery      on scalp     Family History   Problem Relation Age of Onset    Melanoma Neg Hx     Psoriasis Neg Hx     Eczema Neg Hx     Lupus Neg Hx      Social History     Tobacco Use    Smoking status: Former Smoker     Packs/day: 1.00     Years: 30.00     Pack years: 30.00     Start date: 1955     Last attempt to quit: 1985     Years since quittin.2    Smokeless tobacco: Never Used   Substance Use Topics    Alcohol use: Yes     Comment: social    Drug use: No     Review of Systems   Constitutional: Positive for fatigue. Negative for fever.   HENT: Negative for sore throat.    Respiratory: Positive for cough. Negative for shortness of breath.    Cardiovascular: Negative for chest pain.   Gastrointestinal: Negative for abdominal pain, diarrhea and vomiting.   Genitourinary: Negative for dysuria and hematuria.   Musculoskeletal: Positive for arthralgias.   Skin: Negative for rash.   Neurological: Positive for weakness. Negative for headaches.   Hematological: Bruises/bleeds easily.       Physical Exam     Initial Vitals   BP Pulse Resp Temp SpO2   20 1609 20 0938 20 1609 20 0938 20 0938   117/64 110 20 98.7 °F (37.1 °C) 98 %      MAP       --                Physical Exam    Vitals reviewed.  Constitutional: He appears well-developed and well-nourished. He is not diaphoretic.   Pleasant elderly male in NAD and nontoxic appearing resting comfortably on exam bed.   HENT:   Head: Normocephalic and atraumatic.   Nose: Nose normal.   Eyes: Conjunctivae and EOM are normal.   Neck: Normal range of motion.   Cardiovascular: Normal rate.   No peripheral edema or calf tenderness.   Pulmonary/Chest: Breath sounds normal. No accessory muscle usage. No tachypnea. No respiratory distress. He has no wheezes. He has no rales.   Rare cough on exam.   Abdominal: Soft. He exhibits no distension. There is no tenderness. There is no rigidity, no rebound and no guarding.    Musculoskeletal: Normal range of motion.   Neurological: He is alert and oriented to person, place, and time.   Answering questions, but somewhat delay.  Needs some redirecting.  Moves all extremities well.   Skin: Skin is warm and dry. Bruising (old) noted. No erythema.         ED Course   Procedures  Labs Reviewed   CBC W/ AUTO DIFFERENTIAL - Abnormal; Notable for the following components:       Result Value    Mean Corpuscular Volume 99 (*)     Mean Corpuscular Hemoglobin 31.5 (*)     Mean Corpuscular Hemoglobin Conc 31.7 (*)     Platelets 137 (*)     All other components within normal limits   COMPREHENSIVE METABOLIC PANEL - Abnormal; Notable for the following components:     (*)     ALT 55 (*)     Anion Gap 7 (*)     All other components within normal limits   RESPIRATORY INFECTION PANEL (PCR), NASOPHARYNGEAL    Narrative:     For all other respiratory sources, order [id number] -  Respiratory Viral Panel by PCR (RSPFA)   INFLUENZA A & B BY MOLECULAR          Imaging Results          X-Ray Chest 1 View (Final result)  Result time 03/11/20 11:15:53   Procedure changed from X-Ray Chest PA And Lateral     Final result by Alan Abreu MD (03/11/20 11:15:53)                 Impression:      Chronic lung findings without evidence of acute cardiac or pulmonary process.  Incidental osseous findings as described.      Electronically signed by: Alan Abreu MD  Date:    03/11/2020  Time:    11:15             Narrative:    EXAMINATION:  XR CHEST 1 VIEW    CLINICAL HISTORY:  cough/isolation; Cough    TECHNIQUE:  Single frontal view of the chest was performed.    COMPARISON:  Prior study dated 16 May 2016.    FINDINGS:  Sternotomy wire sutures are again demonstrated and there is stable appearance of the cardiomediastinal shadow, both hilar regions and the lungs.  Bilateral pleural apical thickening and calcified plaque along the left hemidiaphragm are again demonstrated.  Coarsened markings are again  demonstrated within both lungs and there is no evidence of focal infiltrate or effusion.  There remains no hilar enlargement.    Degenerative-osteoarthritic findings of both shoulders is demonstrated.  See shaped focus of sclerosis is demonstrated along the inner aspect of the proximal humerus which appears as a possible interval change from the prior examination.  Sclerotic metastatic focus at this site can not be excluded.                                 Medical Decision Making:   History:   Old Medical Records: I decided to obtain old medical records.  Old Records Summarized: records from clinic visits and records from previous admission(s).  Initial Assessment:   Patient is a 85-year-old male who presents the ED for cough and exposure to novel coronavirus.  Differential Diagnosis:   Includes but is not limited to asymptomatic, viral syndrome, anemia, electrolyte abnormalities.  Clinical Tests:   Lab Tests: Ordered and Reviewed  Radiological Study: Ordered and Reviewed  ED Management:  Patient's PUI is [id number]. Test sent off.    CBC with no leukocytosis.  No anemia.  CMP without electrolyte abnormalities.  No kidney injury.  Mild transaminitis noted.  No hyperbilirubinemia.  No signs of obstruction.  Influenza negative.  Chest x-ray negative for acute findings. Chronic lung findings without evidence of acute cardiac or pulmonary process.    4:45 PM.  Patient will be signed out to incoming team pending respiratory infectious panel.  Refer to their progress note.  If vitals remains stable and he remains in NAD, he is stable for discharge with instructions to self-quarantine until he hears CoVid-19 results in 48 hr.  There is no indication to admit this patient to the hospital given that he is stable.        I have reviewed patient's chart and discussed this case with my supervising MD.     Marianela Black PA-C  Emergent Department  [facility]  Spectralink #[id number] or #[id number]                                    Clinical Impression:       ICD-10-CM ICD-9-CM   1. Evaluation by medical service required Z00.00 V82.89   2. Cough R05 786.2         Disposition:   Disposition: Discharged  Condition: Stable     ED Disposition Condition    Discharge Stable        ED Prescriptions     None        Follow-up Information     Follow up With Specialties Details Why Contact Info    [facility] Speech Pathology Schedule an appointment as soon as possible for a visit in 1 week  [address] [address]  [phone]      [facility] Emergency Medicine Go to  If symptoms worsen [address] [id number] [phone]                                     Marianela Black PA-C  03/12/20 0729

## 2020-03-11 NOTE — ED NOTES
Gave patient call bell and turned TV on. Pt assisted into bed. Pt asking for water. Will bring cup of water to room.

## 2020-03-11 NOTE — ED NOTES
Bed: EDOU03  Expected date: 3/11/20  Expected time: 10:04 AM  Means of arrival:   Comments:  alphonse Perez

## 2020-03-12 NOTE — PROVIDER PROGRESS NOTES - EMERGENCY DEPT.
ED Physician Hand-off Note:    ED Course: I assumed care of patient from off-going ED physician team. Briefly, Patient is a 85-year-old male presenting from [facility] for concern for corona virus exposure.  He has no complaints at this time.    At the time of signout plan was pending respiratory viral panel.    Respiratory viral panel is negative.  The patient had previously had samples sent for COVID 19 testing per [place] department of health recommendations.  Given his normal vitals and well appearance he will be discharged home for self isolation.  I discussed at length with the patient as well as with his daughter about precautions for COVID19.    Disposition:  Discharged    Patient comfortable with discharge.  Patient counseled regarding exam, results, diagnosis, treatment, and plan.    Impression:  Screening for COVID 19

## 2020-03-13 ENCOUNTER — HOSPITAL ENCOUNTER (OUTPATIENT)
Facility: HOSPITAL | Age: 85
Discharge: HOME | End: 2020-03-17
Attending: INTERNAL MEDICINE | Admitting: INTERNAL MEDICINE
Payer: MEDICARE

## 2020-03-13 DIAGNOSIS — U07.1 COVID-19 VIRUS INFECTION: Primary | ICD-10-CM

## 2020-03-13 DIAGNOSIS — Z03.89 RULED OUT FOR MYOCARDIAL INFARCTION: ICD-10-CM

## 2020-03-13 DIAGNOSIS — B34.9 VIRAL ILLNESS: ICD-10-CM

## 2020-03-13 LAB
ALBUMIN SERPL BCP-MCNC: 3.4 G/DL (ref 3.5–5.2)
ALP SERPL-CCNC: 100 U/L (ref 55–135)
ALT SERPL W/O P-5'-P-CCNC: 46 U/L (ref 10–44)
ANION GAP SERPL CALC-SCNC: 7 MMOL/L (ref 8–16)
AST SERPL-CCNC: 106 U/L (ref 10–40)
BASOPHILS # BLD AUTO: 0.01 K/UL (ref 0–0.2)
BASOPHILS NFR BLD: 0.3 % (ref 0–1.9)
BILIRUB SERPL-MCNC: 0.5 MG/DL (ref 0.1–1)
BNP SERPL-MCNC: 47 PG/ML (ref 0–99)
BUN SERPL-MCNC: 24 MG/DL (ref 8–23)
CALCIUM SERPL-MCNC: 9.4 MG/DL (ref 8.7–10.5)
CHLORIDE SERPL-SCNC: 106 MMOL/L (ref 95–110)
CO2 SERPL-SCNC: 28 MMOL/L (ref 23–29)
CREAT SERPL-MCNC: 1.2 MG/DL (ref 0.5–1.4)
CRP SERPL-MCNC: 24.4 MG/L (ref 0–8.2)
DIFFERENTIAL METHOD: ABNORMAL
EOSINOPHIL # BLD AUTO: 0 K/UL (ref 0–0.5)
EOSINOPHIL NFR BLD: 0.9 % (ref 0–8)
ERYTHROCYTE [DISTWIDTH] IN BLOOD BY AUTOMATED COUNT: 13.1 % (ref 11.5–14.5)
ERYTHROCYTE [SEDIMENTATION RATE] IN BLOOD BY WESTERGREN METHOD: 6 MM/HR (ref 0–23)
EST. GFR  (AFRICAN AMERICAN): >60 ML/MIN/1.73 M^2
EST. GFR  (NON AFRICAN AMERICAN): 55.6 ML/MIN/1.73 M^2
GLUCOSE SERPL-MCNC: 96 MG/DL (ref 70–110)
HCT VFR BLD AUTO: 45.7 % (ref 40–54)
HGB BLD-MCNC: 14.5 G/DL (ref 14–18)
IMM GRANULOCYTES # BLD AUTO: 0.02 K/UL (ref 0–0.04)
IMM GRANULOCYTES NFR BLD AUTO: 0.6 % (ref 0–0.5)
INFLUENZA A, MOLECULAR: NEGATIVE
INFLUENZA B, MOLECULAR: NEGATIVE
LYMPHOCYTES # BLD AUTO: 0.8 K/UL (ref 1–4.8)
LYMPHOCYTES NFR BLD: 25.5 % (ref 18–48)
MCH RBC QN AUTO: 31.7 PG (ref 27–31)
MCHC RBC AUTO-ENTMCNC: 31.7 G/DL (ref 32–36)
MCV RBC AUTO: 100 FL (ref 82–98)
MONOCYTES # BLD AUTO: 0.3 K/UL (ref 0.3–1)
MONOCYTES NFR BLD: 9.7 % (ref 4–15)
NEUTROPHILS # BLD AUTO: 2.1 K/UL (ref 1.8–7.7)
NEUTROPHILS NFR BLD: 63 % (ref 38–73)
NRBC BLD-RTO: 0 /100 WBC
PLATELET # BLD AUTO: 135 K/UL (ref 150–350)
PMV BLD AUTO: 10.2 FL (ref 9.2–12.9)
POTASSIUM SERPL-SCNC: 4.7 MMOL/L (ref 3.5–5.1)
PROCALCITONIN SERPL IA-MCNC: 0.07 NG/ML
PROT SERPL-MCNC: 6.9 G/DL (ref 6–8.4)
RBC # BLD AUTO: 4.57 M/UL (ref 4.6–6.2)
SODIUM SERPL-SCNC: 141 MMOL/L (ref 136–145)
SPECIMEN SOURCE: NORMAL
TROPONIN I SERPL DL<=0.01 NG/ML-MCNC: 0.04 NG/ML (ref 0–0.03)
WBC # BLD AUTO: 3.3 K/UL (ref 3.9–12.7)

## 2020-03-13 PROCEDURE — 84145 PROCALCITONIN (PCT): CPT

## 2020-03-13 PROCEDURE — 87040 BLOOD CULTURE FOR BACTERIA: CPT

## 2020-03-13 PROCEDURE — G0378 HOSPITAL OBSERVATION PER HR: HCPCS

## 2020-03-13 PROCEDURE — 87502 INFLUENZA DNA AMP PROBE: CPT | Mod: 59

## 2020-03-13 PROCEDURE — 84484 ASSAY OF TROPONIN QUANT: CPT

## 2020-03-13 PROCEDURE — 36415 COLL VENOUS BLD VENIPUNCTURE: CPT

## 2020-03-13 PROCEDURE — G0379 DIRECT REFER HOSPITAL OBSERV: HCPCS

## 2020-03-13 PROCEDURE — 93010 EKG 12-LEAD: ICD-10-PCS | Mod: ,,, | Performed by: INTERNAL MEDICINE

## 2020-03-13 PROCEDURE — 93010 ELECTROCARDIOGRAM REPORT: CPT | Mod: ,,, | Performed by: INTERNAL MEDICINE

## 2020-03-13 PROCEDURE — 93005 ELECTROCARDIOGRAM TRACING: CPT

## 2020-03-13 PROCEDURE — 83880 ASSAY OF NATRIURETIC PEPTIDE: CPT

## 2020-03-13 PROCEDURE — 25000003 PHARM REV CODE 250: Performed by: INTERNAL MEDICINE

## 2020-03-13 PROCEDURE — 85025 COMPLETE CBC W/AUTO DIFF WBC: CPT

## 2020-03-13 PROCEDURE — 86140 C-REACTIVE PROTEIN: CPT

## 2020-03-13 PROCEDURE — 87798 DETECT AGENT NOS DNA AMP: CPT

## 2020-03-13 PROCEDURE — 99220 PR INITIAL OBSERVATION CARE,LEVL III: ICD-10-PCS | Mod: ,,, | Performed by: HOSPITALIST

## 2020-03-13 PROCEDURE — 80053 COMPREHEN METABOLIC PANEL: CPT

## 2020-03-13 PROCEDURE — 99220 PR INITIAL OBSERVATION CARE,LEVL III: CPT | Mod: ,,, | Performed by: HOSPITALIST

## 2020-03-13 PROCEDURE — 85652 RBC SED RATE AUTOMATED: CPT

## 2020-03-13 RX ORDER — SODIUM CHLORIDE 0.9 % (FLUSH) 0.9 %
10 SYRINGE (ML) INJECTION
Status: DISCONTINUED | OUTPATIENT
Start: 2020-03-13 | End: 2020-03-17 | Stop reason: HOSPADM

## 2020-03-13 RX ORDER — IBUPROFEN 200 MG
16 TABLET ORAL
Status: DISCONTINUED | OUTPATIENT
Start: 2020-03-13 | End: 2020-03-17 | Stop reason: HOSPADM

## 2020-03-13 RX ORDER — ATORVASTATIN CALCIUM 20 MG/1
40 TABLET, FILM COATED ORAL DAILY
Status: DISCONTINUED | OUTPATIENT
Start: 2020-03-14 | End: 2020-03-16

## 2020-03-13 RX ORDER — LEVETIRACETAM 250 MG/1
250 TABLET ORAL 2 TIMES DAILY
Status: DISCONTINUED | OUTPATIENT
Start: 2020-03-13 | End: 2020-03-17 | Stop reason: HOSPADM

## 2020-03-13 RX ORDER — ASPIRIN 81 MG/1
81 TABLET ORAL DAILY
Status: DISCONTINUED | OUTPATIENT
Start: 2020-03-14 | End: 2020-03-17 | Stop reason: HOSPADM

## 2020-03-13 RX ORDER — IBUPROFEN 200 MG
24 TABLET ORAL
Status: DISCONTINUED | OUTPATIENT
Start: 2020-03-13 | End: 2020-03-17 | Stop reason: HOSPADM

## 2020-03-13 RX ORDER — GLUCAGON 1 MG
1 KIT INJECTION
Status: DISCONTINUED | OUTPATIENT
Start: 2020-03-13 | End: 2020-03-17 | Stop reason: HOSPADM

## 2020-03-13 RX ADMIN — LEVETIRACETAM 250 MG: 250 TABLET ORAL at 08:03

## 2020-03-13 NOTE — PLAN OF CARE
[facility] [place] admissions ONLY: Please call extension [phone] upon patient arrival to floor for Hospital Medicine admit team assignment and for additional admit orders for the patient. Do not page the attending, staff physician or Advanced Practice Provider with the patient on arrival (may not be in-house at the time of arrival). Call back or wait to leave beeper number when prompted.       (Physician in Lead of Transfers) /[facility] Referral [place] note    Patients name/MRN: Chris Vallecillo/MRN [id number]     Clinic Physician or Mid-Level provider giving report: Dr. Nii Marte (Pulmonary/Critical care  Contact number: [phone]    Date/Time of Acceptance: 3/13/2020 3:45 PM    : Sherry Saenz MD    Reason for direct admit to home:       Report from Physician/Mid-Level Provider/HPI:  66 y/o male who is with CAD with previous CABG x 3 vessel in 2003, HLP, gait abnormality and mitral valve regurgitation was seen in ED on 3/11 with cough and body aches. Influenza returned negative. CXR was normal. Respiratory viral panel was negative. Patient discharged back to [facility]. Wife is presumed positive and has maryanne having fevers and being directly admitted from [facility] today, 3/13 and Dr. Marte and Dr. Ren from ID requested  to be admitted and cohorted in same room as wife for observation and to monitor for worsening respiratory issues.    LABS: See EPIC    Imaging: See EPIC    To Do List upon arrival:     Patient is PUI for COVID19. Patients wife is presumed COVID19 positive. Cohort with wwifeAura.    Special contact and droplet and respiratory isolation as per protocol for PUI for COVID19.     Sherry Saenz MD  Senior Staff Physician   Department of Hospital Medicine  Patient Flow Center/   [phone]

## 2020-03-13 NOTE — H&P
Hospital Medicine  History and Physical Exam    Team: [facility] HOSP MED V Rafiq Cox MD  Admit Date: 3/13/2020    Principal Problem:  Viral illness   Patient information was obtained from patient and ER records.   Primary care Physician: [facility]  Code status: Full Code    HPI: 64 y/o male who is with CAD with previous CABG x 3 vessel in 2003, HLP, gait abnormality and mitral valve regurgitation was seen in ED on 3/11 with cough and body aches. Influenza returned negative. CXR was normal. Respiratory viral panel was negative. Patient discharged back to [facility]. Wife is presumed positive and has maryanne having fevers and being directly admitted from [facility] today, 3/13 and Dr. Marte and Dr. Ren from ID requested  to be admitted and cohorted in same room as wife for observation and to monitor for worsening respiratory issues.    Per  phsyician, [facility] notified that the patient has tested presumptive positive. This result was released shrotly after the patient's arrival to the floor.    Pt. Resting comfortably on arrival. He is a poor historian and unable to provide much information about the time of his symptoms. He reports having a cough and generalized fatigue but denies any fevers, chills, SOB, chest pain, rhinorrhea, sore throat, nausea, or vomiting.    Hemoglobin A1C   Date Value Ref Range Status   02/02/2016 5.3 4.5 - 6.2 % Final       Past Medical History: Patient has a past medical history of Arthritis, Coronary artery disease (8/29/2013), Coronary artery disease involving native coronary artery of native heart without angina pectoris (8/29/2013), Gait abnormality (2/2/2016), Heart attack, CABG (8/29/2013), Hypercholesterolemia (8/29/2013), Hyperlipidemia, Mitral valve disease (8/29/2013), Mitral valve disorders(424.0) (8/29/2013), Movement disorder, Right bundle branch block (3/11/2015), SCC (squamous cell carcinoma), and Squamous Cell Carcinoma.    Past Surgical History:  Patient has a past surgical history that includes mohs surgery.    Social History: Patient reports that he quit smoking about 35 years ago. He started smoking about 65 years ago. He has a 30.00 pack-year smoking history. He has never used smokeless tobacco. He reports that he drinks alcohol. He reports that he does not use drugs.    Family History: family history is not on file.    Medications: reviewed     Allergies: Patient has No Known Allergies.    ROS  Pain Scale: 0 /10   Constitutional: Positive for generalized fatigue, no fevers  Respiratory: Positive for non-productive cough, no shortness of breath  Cardiovascular: no chest pain or palpitations  Gastrointestinal: no nausea or vomiting, no abdominal pain or change in bowel habits  Genitourinary: no hematuria or dysuria  Integument/Breast: no rash or pruritis  Hematologic/Lymphatic: no easy bruising or lymphadenopathy  Musculoskeletal: Positive for myalgias  Neurological: no seizures or tremors  Behavioral/Psych: no depression or anxiety    PEx  Temp:  [99 °F (37.2 °C)]   Pulse:  [92]   Resp:  [20]   BP: (105)/(62)   SpO2:  [94 %]   There is no height or weight on file to calculate BMI.   No intake or output data in the 24 hours ending 03/13/20 1855      General appearance: no distress, pt. Resting comfortably  Mental status: Alert and oriented x 3  HEENT:  conjunctivae/corneas clear, PERRL  Neck: supple, thyroid not enlarged  Pulm:   normal respiratory effort, CTA B, no c/w/r  Card: RRR, S1, S2 normal, no murmur, click, rub or gallop  Abd: soft, NT, ND, BS present; no masses, no organomegaly  Ext: no c/c/e  Pulses: 2+, symmetric  Skin: color, texture, turgor normal. No rashes or lesions  Neuro: CN II-XII grossly intact, no focal numbness or weakness, normal strength and tone     No results found for this or any previous visit (from the past 24 hour(s)).    No results for input(s): POCTGLUCOSE in the last 168 hours.    Active Hospital Problems    Diagnosis   POA    *Viral illness [B34.9]  Yes    Gait abnormality [R26.9]  Yes     2015: Developed gait disorder.      Ataxia [R27.0]  Yes    Coronary artery disease involving native coronary artery of native heart without angina pectoris [I25.10]  Yes     5/2003: [place]: CABG x 3.      Mitral valve disease [I05.9]  Yes     6/5/2012: Echo: Normal LV size and function. 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  6/19/2014: Echo: Mildly dilated LV with normal systolic function. Mild aortic valve sclerosis. Mild AR. Mild myxomatous mitral valve disease. Mild MR.      Hypercholesterolemia [E78.00]  Yes     2003: Began statin.        Resolved Hospital Problems   No resolved problems to display.         Assessment and Plan:  Viral Illness  PUI for COVID-19  -Pt. With cough, myalgias, and fatigue with known close contact with pt. Diagnosed with COVID-19 (wife as well as other residents of nursing home)  -Pt. Elderly with hx of CAD, former smoking, and chronic interstitial changes noted on CXR making him at higher risk for decompensation  -Currently afebrile in stable condition. F/U repeat CBC and BMP  -Monitor closely, f/u COVID testing as well as viral PCR, blood cultures, procal, and legionella antigen to rule out other infection and/or coinfection  -O2 PRN, airborne and droplet isolation status ordered  -limit patient interaction, isolation tray ordered    HLD  -Continue lipitor 40 mg    CAD s/p CABG (2003)  -No acute issues, but f/u EKG and troponin  -Continue ASA and lipitor    DVT PPx: Lovenox    Rafiq Cox MD  Hospital Medicine Staff  [phone] pager

## 2020-03-14 PROBLEM — U07.1 COVID-19 VIRUS INFECTION: Status: ACTIVE | Noted: 2020-03-14

## 2020-03-14 LAB
ADENOVIRUS: NOT DETECTED
BILIRUB UR QL STRIP: NEGATIVE
BORDETELLA PARAPERTUSSIS (IS1001): NOT DETECTED
BORDETELLA PERTUSSIS (PTXP): NOT DETECTED
CHLAMYDIA PNEUMONIAE: NOT DETECTED
CLARITY UR REFRACT.AUTO: CLEAR
COLOR UR AUTO: YELLOW
CORONAVIRUS 229E, COMMON COLD VIRUS: NOT DETECTED
CORONAVIRUS HKU1, COMMON COLD VIRUS: NOT DETECTED
CORONAVIRUS NL63, COMMON COLD VIRUS: NOT DETECTED
CORONAVIRUS OC43, COMMON COLD VIRUS: NOT DETECTED
FLUBV RNA NPH QL NAA+NON-PROBE: NOT DETECTED
GLUCOSE UR QL STRIP: NEGATIVE
HGB UR QL STRIP: ABNORMAL
HPIV1 RNA NPH QL NAA+NON-PROBE: NOT DETECTED
HPIV2 RNA NPH QL NAA+NON-PROBE: NOT DETECTED
HPIV3 RNA NPH QL NAA+NON-PROBE: NOT DETECTED
HPIV4 RNA NPH QL NAA+NON-PROBE: NOT DETECTED
HUMAN METAPNEUMOVIRUS: NOT DETECTED
INFLUENZA A (SUBTYPES H1,H1-2009,H3): NOT DETECTED
KETONES UR QL STRIP: NEGATIVE
LEUKOCYTE ESTERASE UR QL STRIP: NEGATIVE
MICROSCOPIC COMMENT: ABNORMAL
MYCOPLASMA PNEUMONIAE: NOT DETECTED
NITRITE UR QL STRIP: NEGATIVE
PH UR STRIP: 8 [PH] (ref 5–8)
PROT UR QL STRIP: NEGATIVE
RBC #/AREA URNS AUTO: 25 /HPF (ref 0–4)
RESPIRATORY INFECTION PANEL SOURCE: NORMAL
RSV RNA NPH QL NAA+NON-PROBE: NOT DETECTED
RV+EV RNA NPH QL NAA+NON-PROBE: NOT DETECTED
SP GR UR STRIP: 1.02 (ref 1–1.03)
URN SPEC COLLECT METH UR: ABNORMAL
WBC #/AREA URNS AUTO: 2 /HPF (ref 0–5)

## 2020-03-14 PROCEDURE — G0378 HOSPITAL OBSERVATION PER HR: HCPCS

## 2020-03-14 PROCEDURE — 87449 NOS EACH ORGANISM AG IA: CPT

## 2020-03-14 PROCEDURE — 99225 PR SUBSEQUENT OBSERVATION CARE,LEVEL II: CPT | Mod: ,,, | Performed by: INTERNAL MEDICINE

## 2020-03-14 PROCEDURE — 99225 PR SUBSEQUENT OBSERVATION CARE,LEVEL II: ICD-10-PCS | Mod: ,,, | Performed by: INTERNAL MEDICINE

## 2020-03-14 PROCEDURE — 96374 THER/PROPH/DIAG INJ IV PUSH: CPT

## 2020-03-14 PROCEDURE — 25000003 PHARM REV CODE 250: Performed by: INTERNAL MEDICINE

## 2020-03-14 PROCEDURE — 81001 URINALYSIS AUTO W/SCOPE: CPT

## 2020-03-14 PROCEDURE — 96375 TX/PRO/DX INJ NEW DRUG ADDON: CPT

## 2020-03-14 PROCEDURE — 63600175 PHARM REV CODE 636 W HCPCS: Performed by: INTERNAL MEDICINE

## 2020-03-14 RX ORDER — CEFTRIAXONE 1 G/1
1 INJECTION, POWDER, FOR SOLUTION INTRAMUSCULAR; INTRAVENOUS
Status: DISCONTINUED | OUTPATIENT
Start: 2020-03-14 | End: 2020-03-15

## 2020-03-14 RX ADMIN — ATORVASTATIN CALCIUM 40 MG: 20 TABLET, FILM COATED ORAL at 09:03

## 2020-03-14 RX ADMIN — ASPIRIN 81 MG: 81 TABLET, COATED ORAL at 09:03

## 2020-03-14 RX ADMIN — AZITHROMYCIN DIHYDRATE 500 MG: 500 INJECTION, POWDER, LYOPHILIZED, FOR SOLUTION INTRAVENOUS at 04:03

## 2020-03-14 RX ADMIN — LEVETIRACETAM 250 MG: 250 TABLET ORAL at 10:03

## 2020-03-14 RX ADMIN — LEVETIRACETAM 250 MG: 250 TABLET ORAL at 09:03

## 2020-03-14 RX ADMIN — CEFTRIAXONE SODIUM 1 G: 1 INJECTION, POWDER, FOR SOLUTION INTRAMUSCULAR; INTRAVENOUS at 02:03

## 2020-03-14 NOTE — EICU
Rounding (Video Assessment):  Yes    Intervention Initiated From:  COR / EICU    Gary Communicated with Bedside Nurse regarding:  Other    Nurse Notified:  No    Doctor Notified:  No    Comments: Rounds completed. Pt sleeping w. No acute distress noted.

## 2020-03-14 NOTE — EICU
Rounding (Video Assessment):  Yes    Intervention Initiated From:  COR / EICU    Gary Communicated with Bedside Nurse regarding:  Other    Nurse Notified:  No    Doctor Notified:  No    Comments: Rounds completed. Patient sleeping with no distress noted.

## 2020-03-14 NOTE — PLAN OF CARE
Pt is alert with confusion, POC reviewed with the pt. Questions and concerns addressed. Foam protector applied to the sacrum, heels and elbows. Teach pt how to use the IS and pt performed it the right way 10rounds and reached to 800. Needs attended, no acute distress noted. Safety precautions maintained, bed in low position,call light within reached, telesitter on and functioning.

## 2020-03-14 NOTE — PROGRESS NOTES
"Pt arrived to unit floor.  Oriented pt to room. Educated pt on use of call bell.  Pt unable verbalize understanding of beng in Isolation. When asked pt states "Hernan Pineda". Pt placed on telemetry and telesitter. Proper PPE droplet precautions utilized Pt placed in 7088 negative pressure room.  "

## 2020-03-14 NOTE — EICU
Rounding (Video Assessment):  Yes    Intervention Initiated From:  COR / EICU    Gary Communicated with Bedside Nurse regarding:  Other    Nurse Notified:  No    Doctor Notified:  No    Comments: Rounds completed, patient sleeping, no acute distress noted

## 2020-03-14 NOTE — EICU
Rounding (Video Assessment):  Yes    Comments: Resting comfortably. BSS. Monitor with   SR occasionally unifocal PVC's. Sa02 96% on RA, RR 23.NAD noted.

## 2020-03-14 NOTE — NURSING TRANSFER
Nursing Transfer Note      3/13/2020     Transfer 1128a to 7088a    Transfer via wheelchair    Transfer with all belonging    Transported by FRANCK Raymond    Medicines sent: YES    Chart send with patient: YES    Notified    Patient reassessed at: 1030pm 3/13/2020    Upon arrival to floor: pt stable

## 2020-03-14 NOTE — EICU
Rounding (Video Assessment):  Yes    Intervention Initiated From:  COR / EICU    Gary Communicated with Bedside Nurse regarding:  Other    Nurse Notified:  No    Doctor Notified:  No    Comments: Rounds completed. Patient sleeping w no distress noted.

## 2020-03-14 NOTE — EICU
Rounding (Video Assessment):  Yes    Nurse Notified:  Yes May ext [phone]    Comments: Video rounding completed with patient. VSS. Patient awake, alert, confused. Nurse May at bedside. NAD noted. Instructed nurse May to notify us prior to entry so that I can see if patient has any needs prior to entry. Verbalizes understanding.

## 2020-03-15 PROCEDURE — G0378 HOSPITAL OBSERVATION PER HR: HCPCS

## 2020-03-15 PROCEDURE — 63600175 PHARM REV CODE 636 W HCPCS: Performed by: INTERNAL MEDICINE

## 2020-03-15 PROCEDURE — 96376 TX/PRO/DX INJ SAME DRUG ADON: CPT

## 2020-03-15 PROCEDURE — 99225 PR SUBSEQUENT OBSERVATION CARE,LEVEL II: ICD-10-PCS | Mod: ,,, | Performed by: INTERNAL MEDICINE

## 2020-03-15 PROCEDURE — 99225 PR SUBSEQUENT OBSERVATION CARE,LEVEL II: CPT | Mod: ,,, | Performed by: INTERNAL MEDICINE

## 2020-03-15 PROCEDURE — 25000003 PHARM REV CODE 250: Performed by: INTERNAL MEDICINE

## 2020-03-15 RX ADMIN — AZITHROMYCIN DIHYDRATE 500 MG: 500 INJECTION, POWDER, LYOPHILIZED, FOR SOLUTION INTRAVENOUS at 02:03

## 2020-03-15 RX ADMIN — LEVETIRACETAM 250 MG: 250 TABLET ORAL at 09:03

## 2020-03-15 RX ADMIN — ASPIRIN 81 MG: 81 TABLET, COATED ORAL at 09:03

## 2020-03-15 RX ADMIN — CEFTRIAXONE SODIUM 1 G: 1 INJECTION, POWDER, FOR SOLUTION INTRAMUSCULAR; INTRAVENOUS at 02:03

## 2020-03-15 RX ADMIN — ATORVASTATIN CALCIUM 40 MG: 20 TABLET, FILM COATED ORAL at 09:03

## 2020-03-15 NOTE — EICU
Rounding (Video Assessment):  Yes    Intervention Initiated From:  COR / EICU    Gary Communicated with Bedside Nurse regarding:  Other    Nurse Notified:  No    Doctor Notified:  No    Comments: Rounds completed. Patient remains sleeping

## 2020-03-15 NOTE — EICU
Rounding (Video Assessment):  Yes    Comments: 3 rd Video rounding completed. VSS, unable to arouse just down. Sleeping soundly. Will continue to monitor closely

## 2020-03-15 NOTE — PLAN OF CARE
Plan of care discussed with patient. Patient is free of fall/trauma/injury. Denies CP, SOB, or pain/discomfort. All questions addressed. IV abx administered per order. Avasys at bedside. Sputum culture collected. Will continue to monitor

## 2020-03-15 NOTE — EICU
Rounding (Video Assessment):  Yes    Intervention Initiated From:  COR / EICU    Gary Communicated with Bedside Nurse regarding:  Other    Nurse Notified:  No    Doctor Notified:  No    Comments: Rounds completed. Patient sleeping w no distress noted

## 2020-03-15 NOTE — PROGRESS NOTES
Hospital Medicine  Progress Note  [facility]      Patient Name: Chris Vallecillo Jr.  MRN:  [id number]  Hospital Medicine Team: [facility] HOSP MED Z Aidan Scherer MD  Date of Admission:  3/13/2020     Length of Stay:  LOS: 0 days       Principal Problem:  Covid-19 Virus Infection      HPI: 64 y/o male who is with CAD with previous CABG x 3 vessel in 2003, HLP, gait abnormality and mitral valve regurgitation was seen in ED on 3/11 with cough and body aches. Influenza returned negative. CXR was normal. Respiratory viral panel was negative. Patient discharged back to [facility]. Wife is presumed positive and has maryanne having fevers and being directly admitted from [facility] today, 3/13 and Dr. Marte and Dr. Ren from ID requested  to be admitted and cohorted in same room as wife for observation and to monitor for worsening respiratory issues.     Per  phsyician, [facility] notified that the patient has tested presumptive positive. This result was released shrotly after the patient's arrival to the floor.     Pt. Resting comfortably on arrival. He is a poor historian and unable to provide much information about the time of his symptoms. He reports having a cough and generalized fatigue but denies any fevers, chills, SOB, chest pain, rhinorrhea, sore throat, nausea, or vomiting.       Hospital Course:  Patient admitted for management of COVID-19.    Interval History:     Patient stable overnight; respirations ranging 18-24 over past day with sats >92% on RA. Will discontinue empiric abx as no clear indication at this time.        Review of Systems:  Respiratory: Pos dry cough, neg dyspnea  Cardiovascular: neg chest pain, palpitations  GI: neg diarrhea, nausea, vomiting    Inpatient Medications:    Current Facility-Administered Medications:     aspirin EC tablet 81 mg, 81 mg, Oral, Daily, Mitra Gold MD, 81 mg at 03/14/20 0913    atorvastatin tablet 40 mg, 40 mg, Oral, Daily, Mitra  "ANGELIC Gold MD, 40 mg at 03/14/20 0913    azithromycin 500 mg in dextrose 5 % 250 mL IVPB (ready to mix system), 500 mg, Intravenous, Q24H, Mitra Gold MD, Last Rate: 250 mL/hr at 03/14/20 1630, 500 mg at 03/14/20 1630    cefTRIAXone injection 1 g, 1 g, Intravenous, Q24H, Mitra Gold MD, 1 g at 03/14/20 1407    dextrose 10% (D10W) Bolus, 12.5 g, Intravenous, PRN, Mitra Gold MD    dextrose 10% (D10W) Bolus, 25 g, Intravenous, PRN, Mitra Gold MD    glucagon (human recombinant) injection 1 mg, 1 mg, Intramuscular, PRN, Mitra Gold MD    glucose chewable tablet 16 g, 16 g, Oral, PRN, Mitra Gold MD    glucose chewable tablet 24 g, 24 g, Oral, PRN, Mitra Gold MD    levETIRAcetam tablet 250 mg, 250 mg, Oral, BID, Mitra Gold MD, 250 mg at 03/14/20 2200    sodium chloride 0.9% flush 10 mL, 10 mL, Intravenous, PRN, Mitra Gold MD      Physical Exam:     Intake/Output Summary (Last 24 hours) at 3/15/2020 0845  Last data filed at 3/15/2020 0305  Gross per 24 hour   Intake 620 ml   Output 800 ml   Net -180 ml     Wt Readings from Last 3 Encounters:   03/15/20 61.2 kg (134 lb 15.8 oz)   03/03/20 65.8 kg (145 lb)   09/03/19 64.9 kg (143 lb)       BP (!) 98/57 (BP Location: Right arm, Patient Position: Lying)   Pulse 68   Temp 96.8 °F (36 °C) (Oral)   Resp (!) 24   Ht 5' 7" (1.702 m)   Wt 61.2 kg (134 lb 15.8 oz)   SpO2 (!) 94%   BMI 21.14 kg/m²     GEN: NAD, conversant, sitting up in his chair  Resp: CTAB, diffuse coarse breath sounds, no wheezes. On ambient air.  CV: RRR, no m/r/g, no edema  GI: soft, NTND  Skin: sacral wound covered in clean/dry dressing. R elbow with bruise, no evidence of induration, fluctuance, warmth    Laboratory:    Recent Labs   Lab 03/11/20  1317 03/13/20  1834   WBC 4.44 3.30*   HGB 15.1 14.5   HCT 47.6 45.7   * 135*     Recent Labs   Lab 03/11/20  1317 03/13/20  1834    141   K 4.1 4.7    106   CO2 26 " 28   BUN 19 24*   CREATININE 1.1 1.2   GLU 92 96   CALCIUM 9.3 9.4     Recent Labs   Lab 03/11/20  1317 03/13/20  1834   ALKPHOS 104 100   ALT 55* 46*   * 106*   ALBUMIN 3.8 3.4*   PROT 7.4 6.9   BILITOT 0.5 0.5      No results for input(s): POCTGLUCOSE in the last 168 hours.  Recent Labs     03/13/20  1834   TROPONINI 0.044*       No results for input(s): LACTATE in the last 72 hours.     No results for input(s): POCTGLUCOSE in the last 168 hours.  Lab Results   Component Value Date    HGBA1C 5.3 02/02/2016         Microbiology:  Microbiology Results (last 7 days)     Procedure Component Value Units Date/Time    Blood culture [[id number]] Collected:  03/13/20 1834    Order Status:  Completed Specimen:  Blood Updated:  03/14/20 2012     Blood Culture, Routine No Growth to date      No Growth to date    Narrative:       Collection has been rescheduled by BDW at 03/13/2020 18:01 Reason:   #[id number] per RN Isabella out of masks  Collection has been rescheduled by BDW at 03/13/2020 18:01 Reason:   #[id number] per RN Isabella out of masks    Respiratory Infection Panel (PCR), Nasopharyngeal [[id number]] Collected:  03/13/20 1941    Order Status:  Completed Specimen:  Nasopharyngeal Swab Updated:  03/14/20 0659     Respiratory Infection Panel Source NP Swab     Adenovirus Not Detected     Coronavirus 229E, Common Cold Virus Not Detected     Coronavirus HKU1, Common Cold Virus Not Detected     Coronavirus NL63, Common Cold Virus Not Detected     Coronavirus OC43, Common Cold Virus Not Detected     Comment: The Coronavirus strains detected in this test cause the common cold.  These strains are not the COVID-19 (novel Coronavirus)strain   associated with the respiratory disease outbreak.          Human Metapneumovirus Not Detected     Human Rhinovirus/Enterovirus Not Detected     Influenza A (subtypes H1, H1-2009,H3) Not Detected     Influenza B Not Detected     Parainfluenza Virus 1 Not Detected     Parainfluenza Virus 2 Not Detected      Parainfluenza Virus 3 Not Detected     Parainfluenza Virus 4 Not Detected     Respiratory Syncytial Virus Not Detected     Bordetella Parapertussis (AX3311) Not Detected     Bordetella pertussis (ptxP) Not Detected     Chlamydia pneumoniae Not Detected     Mycoplasma pneumoniae Not Detected     Comment: Respiratory Infection Panel testing performed by Multiplex PCR.       Narrative:       Nasal Swab Only - for all other respiratory sources, order  [id number] - Respiratory Viral Panel by PCR (RSPFA)    Influenza A & B by Molecular [[id number]] Collected:  03/13/20 1941    Order Status:  Completed Specimen:  Nasopharyngeal Swab Updated:  03/13/20 2033     Influenza A, Molecular Negative     Influenza B, Molecular Negative     Flu A & B Source Nasal swab    Culture, Respiratory with Gram Stain [[id number]]     Order Status:  No result Specimen:  Respiratory             Imaging    X-Ray Chest 1 View  Narrative: EXAMINATION:  XR CHEST 1 VIEW    CLINICAL HISTORY:  cough/isolation; Cough    TECHNIQUE:  Single frontal view of the chest was performed.    COMPARISON:  Prior study dated 16 May 2016.    FINDINGS:  Sternotomy wire sutures are again demonstrated and there is stable appearance of the cardiomediastinal shadow, both hilar regions and the lungs.  Bilateral pleural apical thickening and calcified plaque along the left hemidiaphragm are again demonstrated.  Coarsened markings are again demonstrated within both lungs and there is no evidence of focal infiltrate or effusion.  There remains no hilar enlargement.    Degenerative-osteoarthritic findings of both shoulders is demonstrated.  See shaped focus of sclerosis is demonstrated along the inner aspect of the proximal humerus which appears as a possible interval change from the prior examination.  Sclerotic metastatic focus at this site can not be excluded.  Impression: Chronic lung findings without evidence of acute cardiac or pulmonary process.  Incidental osseous  findings as described.    Electronically signed by: Alan Abreu MD  Date:    03/11/2020  Time:    11:15      Assessment and Plan:  Active Hospital Problems    Diagnosis  POA    *Covid-19 Virus Infection [J22, B97.29]  Yes    Viral illness [B34.9]  Yes    Gait abnormality [R26.9]  Yes     2015: Developed gait disorder.      Ataxia [R27.0]  Yes    Coronary artery disease involving native coronary artery of native heart without angina pectoris [I25.10]  Yes     5/2003: [place]: CABG x 3.      Mitral valve disease [I05.9]  Yes     6/5/2012: Echo: Normal LV size and function. 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  6/19/2014: Echo: Mildly dilated LV with normal systolic function. Mild aortic valve sclerosis. Mild AR. Mild myxomatous mitral valve disease. Mild MR.      Hypercholesterolemia [E78.00]  Yes     2003: Began statin.        Resolved Hospital Problems   No resolved problems to display.       Presumptive positive for COVID-19  - COVID-19   - Infection Control notified  - Isolation:   - Airborne and Droplet Precautions  - N95 masks must be fit tested, wear eye protection  - 20 second hand hygiene   - Limit visitors per hospital policy  - Diagnostics (leukopenia, hyponatremia, hyperferritinemia, elevated troponin, elevated d-dimer, age, and comorbidities are significant predictors of poor clinical outcome)   - CBC: WBC 3.3   - CMP: stable   - CRP 24, Procalcitonin 0.07    -troponin 0.04,    - ECG nonischemic   - rapid Flu neg   - RIP neg   - Blood culture ngtd   - Portable CXR - chronic lung disease without acute process    - UA and culture: RBCs  - Management:   - Supplemental O2 to maintain SpO2 >92%   - Telemetry & Continuous Pulse Ox   - albuterol INHALER PRN (avoid nebulization of secretions)   - Careful use of NiPPV, HFNC, IVF, and steroids if warranted    - Empiric antibiotics per likely source & patient allergies    - CAP: azithromycin & ceftriaxone - first dose 3/14 -  discontinued on 3/15 as no   demonstration of consolidated airspace disease      Patient's chronic/stable medical conditions noted in the progress note above will be managed with the patient's home medications as tolerated.       Aidan Scherer MD  Department of Hospital Medicine  [facility]  (pager) [phone] (spect) [phone]

## 2020-03-15 NOTE — PROGRESS NOTES
2300- Pt's telemetry box stopped picking up at 1940. Have been in contact with tele since. Leads have been changed, battery has been changed, and a new box was tried. Called tele once pt hooked up to new box to clarify if pt's telemetry box is coming through on their end. Tele says pt's box is still not picking up. Currently on waiting list for a new tele box.      0330- Contacted tele and pt's tele box is now reading.

## 2020-03-15 NOTE — EICU
Rounding (Video Assessment):  Yes    Intervention Initiated From:  COR / EICU    Gary Communicated with Bedside Nurse regarding:  Other    Nurse Notified:  No    Doctor Notified:  No    Comments: Rounds completed. Patient lying in bed watching TV. NO distress noted

## 2020-03-15 NOTE — EICU
Rounding (Video Assessment):  Yes    Comments: 3rd video rounding completed. Nurse in room. NAD noted. Will continue to monitor.

## 2020-03-15 NOTE — EICU
Rounding (Video Assessment):  Yes    Intervention Initiated From:  COR / EICU    Gary Communicated with Bedside Nurse regarding:  Other    Nurse Notified:  No    Doctor Notified:  No    Comments: Rounds completed. Patient sleeping no distress noted

## 2020-03-15 NOTE — PLAN OF CARE
POC reviewed with pt at 0500. Pt verbalized understanding. Questions and concerns addressed. No acute events overnight. Pt sat's > 92 on RA. Afebrile throughout shift. Pt progressing toward goals. Will continue to monitor. See flowsheets for full assessment and VS info        Problem: Adult Inpatient Plan of Care  Goal: Plan of Care Review  Outcome: Ongoing, Progressing  Flowsheets (Taken 3/15/2020 0744)  Plan of Care Reviewed With: patient     Problem: Fall Injury Risk  Goal: Absence of Fall and Fall-Related Injury  Outcome: Ongoing, Progressing  Intervention: Identify and Manage Contributors to Fall Injury Risk  Flowsheets (Taken 3/15/2020 0744)  Medication Review/Management: medications reviewed  Intervention: Promote Injury-Free Environment  Flowsheets (Taken 3/15/2020 0744)  Safety Promotion/Fall Prevention: bed alarm set; side rails raised x 3  Environmental Safety Modification: clutter free environment maintained; lighting adjusted

## 2020-03-15 NOTE — EICU
Rounding (Video Assessment):  Yes    Comments: Video rounding completed. VSS. On room air Fi02 96% NAD noted RR 18, Cardiac monitor with SR rate 88. Will continue to monitor

## 2020-03-15 NOTE — EICU
Rounding (Video Assessment):   Yes    Comments: Sitting up in bed. Has breakfast tray in front of him, though patient is sleeping. RR 21 Sa02 95% on RA. SR rate 69. NAD noted.

## 2020-03-15 NOTE — PROGRESS NOTES
Hospital Medicine  Progress Note  [facility]      Patient Name: Chris Vallecillo Jr.  MRN:  [id number]  Hospital Medicine Team: [facility] Mitra Gold MD  Date of Admission:  3/13/2020     Length of Stay:  LOS: 0 days       Principal Problem:  Covid-19 Virus Infection      HPI: 66 y/o male who is with CAD with previous CABG x 3 vessel in 2003, HLP, gait abnormality and mitral valve regurgitation was seen in ED on 3/11 with cough and body aches. Influenza returned negative. CXR was normal. Respiratory viral panel was negative. Patient discharged back to [facility]. Wife is presumed positive and has maryanne having fevers and being directly admitted from [facility] today, 3/13 and Dr. Marte and Dr. Ren from ID requested  to be admitted and cohorted in same room as wife for observation and to monitor for worsening respiratory issues.     Per  phsyician, [facility] notified that the patient has tested presumptive positive. This result was released shrotly after the patient's arrival to the floor.     Pt. Resting comfortably on arrival. He is a poor historian and unable to provide much information about the time of his symptoms. He reports having a cough and generalized fatigue but denies any fevers, chills, SOB, chest pain, rhinorrhea, sore throat, nausea, or vomiting.       Hospital Course:  Patient admitted for management of COVID-19.    Interval History:     Sitting up in the chair at the bedside. No supplemental O2 requirement at this time. Pleasant and conversant, loses track of the conversation. Fatigue is improved.     Confirmed his code status with his wife, who is his POA. Called his two daughters with medical updates.     Review of Systems:  Respiratory: Pos dry cough, neg dyspnea  Cardiovascular: neg chest pain, palpitations  GI: neg diarrhea, nausea, vomiting    Inpatient Medications:    Current Facility-Administered Medications:     aspirin EC tablet 81 mg, 81 mg,  "Oral, Daily, Mitra Gold MD, 81 mg at 03/14/20 0913    atorvastatin tablet 40 mg, 40 mg, Oral, Daily, Mitra Gold MD, 40 mg at 03/14/20 0913    azithromycin 500 mg in dextrose 5 % 250 mL IVPB (ready to mix system), 500 mg, Intravenous, Q24H, Mitra Gold MD, Last Rate: 250 mL/hr at 03/14/20 1630, 500 mg at 03/14/20 1630    cefTRIAXone injection 1 g, 1 g, Intravenous, Q24H, Mitra Gold MD, 1 g at 03/14/20 1407    dextrose 10% (D10W) Bolus, 12.5 g, Intravenous, PRN, Mitra Gold MD    dextrose 10% (D10W) Bolus, 25 g, Intravenous, PRN, Mitra Gold MD    glucagon (human recombinant) injection 1 mg, 1 mg, Intramuscular, PRN, Mitra Gold MD    glucose chewable tablet 16 g, 16 g, Oral, PRN, Mitra Gold MD    glucose chewable tablet 24 g, 24 g, Oral, PRN, Mitra Gold MD    levETIRAcetam tablet 250 mg, 250 mg, Oral, BID, Mitra Gold MD, 250 mg at 03/14/20 0913    sodium chloride 0.9% flush 10 mL, 10 mL, Intravenous, PRN, Mitra Gold MD      Physical Exam:     Intake/Output Summary (Last 24 hours) at 3/14/2020 1911  Last data filed at 3/14/2020 1825  Gross per 24 hour   Intake 590 ml   Output 550 ml   Net 40 ml     Wt Readings from Last 3 Encounters:   03/14/20 61.2 kg (135 lb)   03/03/20 65.8 kg (145 lb)   09/03/19 64.9 kg (143 lb)       /83   Pulse 72   Temp 97.6 °F (36.4 °C) (Oral)   Resp 19   Ht 5' 7" (1.702 m)   Wt 61.2 kg (135 lb)   SpO2 96%   BMI 21.14 kg/m²     GEN: NAD, conversant, sitting up in his chair  Resp: CTAB, diffuse coarse breath sounds, no wheezes. On ambient air.  CV: RRR, no m/r/g, no edema  GI: soft, NTND  Skin: sacral wound covered in clean/dry dressing. R elbow with bruise, no evidence of induration, fluctuance, warmth    Laboratory:    Recent Labs   Lab 03/11/20  1317 03/13/20  1834   WBC 4.44 3.30*   HGB 15.1 14.5   HCT 47.6 45.7   * 135*     Recent Labs   Lab 03/11/20  1317 03/13/20  1834   NA " 138 141   K 4.1 4.7    106   CO2 26 28   BUN 19 24*   CREATININE 1.1 1.2   GLU 92 96   CALCIUM 9.3 9.4     Recent Labs   Lab 03/11/20  1317 03/13/20  1834   ALKPHOS 104 100   ALT 55* 46*   * 106*   ALBUMIN 3.8 3.4*   PROT 7.4 6.9   BILITOT 0.5 0.5      No results for input(s): POCTGLUCOSE in the last 168 hours.  Recent Labs     03/13/20  1834   TROPONINI 0.044*       No results for input(s): LACTATE in the last 72 hours.     No results for input(s): POCTGLUCOSE in the last 168 hours.  Lab Results   Component Value Date    HGBA1C 5.3 02/02/2016         Microbiology:  Microbiology Results (last 7 days)     Procedure Component Value Units Date/Time    Respiratory Infection Panel (PCR), Nasopharyngeal [[id number]] Collected:  03/13/20 1941    Order Status:  Completed Specimen:  Nasopharyngeal Swab Updated:  03/14/20 0659     Respiratory Infection Panel Source NP Swab     Adenovirus Not Detected     Coronavirus 229E, Common Cold Virus Not Detected     Coronavirus HKU1, Common Cold Virus Not Detected     Coronavirus NL63, Common Cold Virus Not Detected     Coronavirus OC43, Common Cold Virus Not Detected     Comment: The Coronavirus strains detected in this test cause the common cold.  These strains are not the COVID-19 (novel Coronavirus)strain   associated with the respiratory disease outbreak.          Human Metapneumovirus Not Detected     Human Rhinovirus/Enterovirus Not Detected     Influenza A (subtypes H1, H1-2009,H3) Not Detected     Influenza B Not Detected     Parainfluenza Virus 1 Not Detected     Parainfluenza Virus 2 Not Detected     Parainfluenza Virus 3 Not Detected     Parainfluenza Virus 4 Not Detected     Respiratory Syncytial Virus Not Detected     Bordetella Parapertussis (BA2519) Not Detected     Bordetella pertussis (ptxP) Not Detected     Chlamydia pneumoniae Not Detected     Mycoplasma pneumoniae Not Detected     Comment: Respiratory Infection Panel testing performed by Multiplex PCR.        Narrative:       Nasal Swab Only - for all other respiratory sources, order  [id number] - Respiratory Viral Panel by PCR (RSPFA)    Blood culture [[id number]] Collected:  03/13/20 1834    Order Status:  Completed Specimen:  Blood Updated:  03/14/20 0145     Blood Culture, Routine No Growth to date    Narrative:       Collection has been rescheduled by BDW at 03/13/2020 18:01 Reason:   #[id number] per RN Isabella out of masks  Collection has been rescheduled by BDW at 03/13/2020 18:01 Reason:   #[id number] per RN Isabella out of masks    Influenza A & B by Molecular [[id number]] Collected:  03/13/20 1941    Order Status:  Completed Specimen:  Nasopharyngeal Swab Updated:  03/13/20 2033     Influenza A, Molecular Negative     Influenza B, Molecular Negative     Flu A & B Source Nasal swab    Culture, Respiratory with Gram Stain [[id number]]     Order Status:  No result Specimen:  Respiratory             Imaging    X-Ray Chest 1 View  Narrative: EXAMINATION:  XR CHEST 1 VIEW    CLINICAL HISTORY:  cough/isolation; Cough    TECHNIQUE:  Single frontal view of the chest was performed.    COMPARISON:  Prior study dated 16 May 2016.    FINDINGS:  Sternotomy wire sutures are again demonstrated and there is stable appearance of the cardiomediastinal shadow, both hilar regions and the lungs.  Bilateral pleural apical thickening and calcified plaque along the left hemidiaphragm are again demonstrated.  Coarsened markings are again demonstrated within both lungs and there is no evidence of focal infiltrate or effusion.  There remains no hilar enlargement.    Degenerative-osteoarthritic findings of both shoulders is demonstrated.  See shaped focus of sclerosis is demonstrated along the inner aspect of the proximal humerus which appears as a possible interval change from the prior examination.  Sclerotic metastatic focus at this site can not be excluded.  Impression: Chronic lung findings without evidence of acute cardiac or pulmonary process.   Incidental osseous findings as described.    Electronically signed by: Alan Abreu MD  Date:    03/11/2020  Time:    11:15      Assessment and Plan:  Active Hospital Problems    Diagnosis  POA    *Covid-19 Virus Infection [J22, B97.29]  Yes    Viral illness [B34.9]  Yes    Gait abnormality [R26.9]  Yes     2015: Developed gait disorder.      Ataxia [R27.0]  Yes    Coronary artery disease involving native coronary artery of native heart without angina pectoris [I25.10]  Yes     5/2003: [place]: CABG x 3.      Mitral valve disease [I05.9]  Yes     6/5/2012: Echo: Normal LV size and function. 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  6/19/2014: Echo: Mildly dilated LV with normal systolic function. Mild aortic valve sclerosis. Mild AR. Mild myxomatous mitral valve disease. Mild MR.      Hypercholesterolemia [E78.00]  Yes     2003: Began statin.        Resolved Hospital Problems   No resolved problems to display.       Presumptive positive for COVID-19  - COVID-19   - Infection Control notified  - Isolation:   - Airborne and Droplet Precautions  - N95 masks must be fit tested, wear eye protection  - 20 second hand hygiene   - Limit visitors per hospital policy  - Diagnostics (leukopenia, hyponatremia, hyperferritinemia, elevated troponin, elevated d-dimer, age, and comorbidities are significant predictors of poor clinical outcome)   - CBC: WBC 3.3, CMP, ferritin, troponin 0.04, CRP 24, LDH, Procalcitonin 0.07   - ECG nonischemic   - rapid Flu neg   - RIP neg   - Blood culture ngtd   - Portable CXR   - UA and culture: RBCs  - Management:   - Supplemental O2 to maintain SpO2 >92%   - Telemetry & Continuous Pulse Ox   - albuterol INHALER PRN (avoid nebulization of secretions)   - No BiPAP to avoid aerosolization (including home BiPAP)   - No steroids unless septic shock due to increased viral replication   - fluid sparing resuscitation   - Empiric antibiotics per likely source &  patient allergies    - CAP: azithromycin & ceftriaxone      Patient's chronic/stable medical conditions noted in the progress note above will be managed with the patient's home medications as tolerated.       Mitra Gold M.D., M.P.H.  Department of Hospital Medicine  [facility]  (pager) [phone] (spect) [phone]

## 2020-03-16 PROCEDURE — 99225 PR SUBSEQUENT OBSERVATION CARE,LEVEL II: ICD-10-PCS | Mod: ,,, | Performed by: INTERNAL MEDICINE

## 2020-03-16 PROCEDURE — G0378 HOSPITAL OBSERVATION PER HR: HCPCS

## 2020-03-16 PROCEDURE — 94761 N-INVAS EAR/PLS OXIMETRY MLT: CPT

## 2020-03-16 PROCEDURE — 99225 PR SUBSEQUENT OBSERVATION CARE,LEVEL II: CPT | Mod: ,,, | Performed by: INTERNAL MEDICINE

## 2020-03-16 PROCEDURE — 25000003 PHARM REV CODE 250: Performed by: INTERNAL MEDICINE

## 2020-03-16 RX ADMIN — LEVETIRACETAM 250 MG: 250 TABLET ORAL at 09:03

## 2020-03-16 RX ADMIN — ATORVASTATIN CALCIUM 40 MG: 20 TABLET, FILM COATED ORAL at 09:03

## 2020-03-16 RX ADMIN — ASPIRIN 81 MG: 81 TABLET, COATED ORAL at 09:03

## 2020-03-16 NOTE — PLAN OF CARE
CM phoned patient's daughter, Jennifer Goldsmith - [phone] for [place] Planning Assessment.  Patient is unable to answer questions.  Per daughter, the patient lives with his wife in an independent living apartment at [facility] with no step(s) to enter. Patient's wife is also currently in the hospital.   Per daughter, the patient has baseline dementia and needed assistance with ADLS and used a rolling walker for ambulation.  Patient will have assistance from family upon discharge. Daughter stated that the patient cannot return to [facility] alone in the independent living due to dementia.  Daughter stated that she is concerned that if patient is ready for discharge before his wife, she will have no where for patient to go due to the fact that she (the daughter) is in self isolation in one part of her home and her family in another.  Per daughter, she is not sure if [facility] will let her in or out if she can go stay with him.    CM informed daughter, that CM and SW as well as medical staff will work to ensure a safe discharge for both of her parents.   All questions addressed.  CM will follow for needs.       03/16/20 0102   Discharge Assessment   Assessment Type Discharge Planning Assessment   Confirmed/corrected address and phone number on facesheet? Yes   Assessment information obtained from? Caregiver  (daughter, Jennifer Goldsmith via phone)   Expected Length of Stay (days) 14   Communicated expected length of stay with patient/caregiver yes   Prior to hospitilization cognitive status: Unable to Assess  (Per daughter, patient with baseline dementia)   Prior to hospitalization functional status: Needs Assistance   Current cognitive status: Unable to Assess   Current Functional Status: Needs Assistance   Lives With spouse  (Independent living apartment at [facility])   Able to Return to Prior Arrangements other (see comments)  (madelyn)   Is patient able to care for self after discharge? Unable to determine at this  time (comments)   Who are your caregiver(s) and their phone number(s)? FAWN TOM- [phone] DAUGHTER   Patient's perception of discharge disposition other (comments)  (madelyn)   Readmission Within the Last 30 Days no previous admission in last 30 days   Patient currently being followed by outpatient case management? No   Patient currently receives any other outside agency services? No   Equipment Currently Used at Home walker, rolling   Do you have any problems affording any of your prescribed medications? No   Is the patient taking medications as prescribed? yes   Does the patient have transportation home? Yes   Transportation Anticipated family or friend will provide   Does the patient receive services at the [facility]? No   Discharge Plan A Home;Independent living facility   Discharge Plan B Home;Independent living facility   DME Needed Upon Discharge  other (see comments)  (tbd)   Patient/Family in Agreement with Plan yes                PCP:  [facility]  Dr. Yesica Nuno      Pharmacy:    [facility] #[id number] - [place], [place] - [address]  [address]  [facility] [address]  Phone: [phone] Fax: [phone]    [id number] [place], [place]  [address]  [address]  [facility] [address]  Phone: [phone] Fax: [phone]        Emergency Contacts:  Extended Emergency Contact Information  Primary Emergency Contact: Aura Vallecillo  Address: [address] [place] [address]  Home Phone: [phone]  Mobile Phone: [phone]  Relation: Spouse  Secondary Emergency Contact: Fawn Tom  Mobile Phone: [phone]  Relation: Daughter      Insurance:    Payor: MEDICARE / Plan: MEDICARE PART A & B / Product Type: Government /       Namita Montenegro RN, CCRN-K, [facility]  Neuro-Critical Care   X [phone]    03/16/2020  2:01 PM

## 2020-03-16 NOTE — PROGRESS NOTES
Hospital Medicine  Progress Note  [facility]      Patient Name: Chris Vallecillo Jr.  MRN:  [id number]  Hospital Medicine Team: [facility] Aidan Scherer MD  Date of Admission:  3/13/2020     Length of Stay:  LOS: 0 days       Principal Problem:  Covid-19 Virus Infection      HPI: 64 y/o male who is with CAD with previous CABG x 3 vessel in 2003, HLP, gait abnormality and mitral valve regurgitation was seen in ED on 3/11 with cough and body aches. Influenza returned negative. CXR was normal. Respiratory viral panel was negative. Patient discharged back to [facility]. Wife is presumed positive and has maryanne having fevers and being directly admitted from [facility] today, 3/13 and Dr. Marte and Dr. Ren from ID requested  to be admitted and cohorted in same room as wife for observation and to monitor for worsening respiratory issues.     Per  phsyician, [facility] notified that the patient has tested presumptive positive. This result was released shrotly after the patient's arrival to the floor.     Pt. Resting comfortably on arrival. He is a poor historian and unable to provide much information about the time of his symptoms. He reports having a cough and generalized fatigue but denies any fevers, chills, SOB, chest pain, rhinorrhea, sore throat, nausea, or vomiting.       Hospital Course:  Patient admitted for management of COVID-19.    Interval History:     NAEON.  Patient stable; no nursing concerns. KOBE.  Stop statin at pharmacy request for mild elevation of LFTs. CM working with family to plan for discharge disposition.        Review of Systems:  Respiratory: Pos dry cough, neg dyspnea  Cardiovascular: neg chest pain, palpitations  GI: neg diarrhea, nausea, vomiting    Inpatient Medications:    Current Facility-Administered Medications:     aspirin EC tablet 81 mg, 81 mg, Oral, Daily, Mitra Gold MD, 81 mg at 03/16/20 0913    dextrose 10% (D10W) Bolus, 12.5 g,  "Intravenous, PRN, Mitra Gold MD    dextrose 10% (D10W) Bolus, 25 g, Intravenous, PRN, Mitra Gold MD    glucagon (human recombinant) injection 1 mg, 1 mg, Intramuscular, PRN, Mitra Gold MD    glucose chewable tablet 16 g, 16 g, Oral, PRN, Mitra Gold MD    glucose chewable tablet 24 g, 24 g, Oral, PRN, Mitra Gold MD    levETIRAcetam tablet 250 mg, 250 mg, Oral, BID, Mitra Gold MD, 250 mg at 03/16/20 0913    sodium chloride 0.9% flush 10 mL, 10 mL, Intravenous, PRN, Mitra Gold MD      Physical Exam:     Intake/Output Summary (Last 24 hours) at 3/16/2020 1514  Last data filed at 3/16/2020 1500  Gross per 24 hour   Intake 150 ml   Output 850 ml   Net -700 ml     Wt Readings from Last 3 Encounters:   03/15/20 61.2 kg (134 lb 15.8 oz)   03/03/20 65.8 kg (145 lb)   09/03/19 64.9 kg (143 lb)       BP (!) 103/55 (BP Location: Right arm)   Pulse 66   Temp 96.6 °F (35.9 °C) (Oral)   Resp (!) 24   Ht 5' 7" (1.702 m)   Wt 61.2 kg (134 lb 15.8 oz)   SpO2 97%   BMI 21.14 kg/m²     GEN: Awake, alert, conversant   Resp: normal respiration rate, no WOB, no visible cough  Skin: sacral wound covered in clean/dry dressing.     aboratory:    Recent Labs   Lab 03/11/20 1317 03/13/20  1834   WBC 4.44 3.30*   HGB 15.1 14.5   HCT 47.6 45.7   * 135*     Recent Labs   Lab 03/11/20 1317 03/13/20  1834    141   K 4.1 4.7    106   CO2 26 28   BUN 19 24*   CREATININE 1.1 1.2   GLU 92 96   CALCIUM 9.3 9.4     Recent Labs   Lab 03/11/20 1317 03/13/20  1834   ALKPHOS 104 100   ALT 55* 46*   * 106*   ALBUMIN 3.8 3.4*   PROT 7.4 6.9   BILITOT 0.5 0.5      No results for input(s): POCTGLUCOSE in the last 168 hours.  Recent Labs     03/13/20  1834   TROPONINI 0.044*       No results for input(s): LACTATE in the last 72 hours.     No results for input(s): POCTGLUCOSE in the last 168 hours.  Lab Results   Component Value Date    HGBA1C 5.3 02/02/2016 "         Microbiology:  Microbiology Results (last 7 days)     Procedure Component Value Units Date/Time    Blood culture [[id number]] Collected:  03/13/20 1834    Order Status:  Completed Specimen:  Blood Updated:  03/15/20 2012     Blood Culture, Routine No Growth to date      No Growth to date      No Growth to date    Narrative:       Collection has been rescheduled by BDW at 03/13/2020 18:01 Reason:   #[id number] per RN Isabella out of masks  Collection has been rescheduled by BDW at 03/13/2020 18:01 Reason:   #[id number] per RN Isabella out of masks    Respiratory Infection Panel (PCR), Nasopharyngeal [[id number]] Collected:  03/13/20 1941    Order Status:  Completed Specimen:  Nasopharyngeal Swab Updated:  03/14/20 0659     Respiratory Infection Panel Source NP Swab     Adenovirus Not Detected     Coronavirus 229E, Common Cold Virus Not Detected     Coronavirus HKU1, Common Cold Virus Not Detected     Coronavirus NL63, Common Cold Virus Not Detected     Coronavirus OC43, Common Cold Virus Not Detected     Comment: The Coronavirus strains detected in this test cause the common cold.  These strains are not the COVID-19 (novel Coronavirus)strain   associated with the respiratory disease outbreak.          Human Metapneumovirus Not Detected     Human Rhinovirus/Enterovirus Not Detected     Influenza A (subtypes H1, H1-2009,H3) Not Detected     Influenza B Not Detected     Parainfluenza Virus 1 Not Detected     Parainfluenza Virus 2 Not Detected     Parainfluenza Virus 3 Not Detected     Parainfluenza Virus 4 Not Detected     Respiratory Syncytial Virus Not Detected     Bordetella Parapertussis (JC2347) Not Detected     Bordetella pertussis (ptxP) Not Detected     Chlamydia pneumoniae Not Detected     Mycoplasma pneumoniae Not Detected     Comment: Respiratory Infection Panel testing performed by Multiplex PCR.       Narrative:       Nasal Swab Only - for all other respiratory sources, order  [id number] - Respiratory Viral Panel by PCR  ([facility])    Influenza A & B by Molecular [[id number]] Collected:  03/13/20 1941    Order Status:  Completed Specimen:  Nasopharyngeal Swab Updated:  03/13/20 2033     Influenza A, Molecular Negative     Influenza B, Molecular Negative     Flu A & B Source Nasal swab    Culture, Respiratory with Gram Stain [[id number]]     Order Status:  No result Specimen:  Respiratory             Imaging    X-Ray Chest 1 View  Narrative: EXAMINATION:  XR CHEST 1 VIEW    CLINICAL HISTORY:  cough/isolation; Cough    TECHNIQUE:  Single frontal view of the chest was performed.    COMPARISON:  Prior study dated 16 May 2016.    FINDINGS:  Sternotomy wire sutures are again demonstrated and there is stable appearance of the cardiomediastinal shadow, both hilar regions and the lungs.  Bilateral pleural apical thickening and calcified plaque along the left hemidiaphragm are again demonstrated.  Coarsened markings are again demonstrated within both lungs and there is no evidence of focal infiltrate or effusion.  There remains no hilar enlargement.    Degenerative-osteoarthritic findings of both shoulders is demonstrated.  See shaped focus of sclerosis is demonstrated along the inner aspect of the proximal humerus which appears as a possible interval change from the prior examination.  Sclerotic metastatic focus at this site can not be excluded.  Impression: Chronic lung findings without evidence of acute cardiac or pulmonary process.  Incidental osseous findings as described.    Electronically signed by: Alan Abreu MD  Date:    03/11/2020  Time:    11:15      Assessment and Plan:  Active Hospital Problems    Diagnosis  POA    *Covid-19 Virus Infection [J22, B97.29]  Yes    Viral illness [B34.9]  Yes    Gait abnormality [R26.9]  Yes     2015: Developed gait disorder.      Ataxia [R27.0]  Yes    Coronary artery disease involving native coronary artery of native heart without angina pectoris [I25.10]  Yes     5/2003: [place]: CABG x 3.       Mitral valve disease [I05.9]  Yes     6/5/2012: Echo: Normal LV size and function. 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  6/19/2014: Echo: Mildly dilated LV with normal systolic function. Mild aortic valve sclerosis. Mild AR. Mild myxomatous mitral valve disease. Mild MR.      Hypercholesterolemia [E78.00]  Yes     2003: Began statin.        Resolved Hospital Problems   No resolved problems to display.       Presumptive positive for COVID-19  - COVID-19   - Infection Control notified  - Isolation:   - Airborne and Droplet Precautions  - N95 masks must be fit tested, wear eye protection  - 20 second hand hygiene   - Limit visitors per hospital policy  - Diagnostics (leukopenia, hyponatremia, hyperferritinemia, elevated troponin, elevated d-dimer, age, and comorbidities are significant predictors of poor clinical outcome)   - CBC: WBC 3.3   - CMP: stable   - CRP 24, Procalcitonin 0.07    -troponin 0.04,    - ECG nonischemic   - rapid Flu neg   - RIP neg   - Blood culture ngtd   - Portable CXR - chronic lung disease without acute process    - UA and culture: RBCs  - Management:   - Supplemental O2 to maintain SpO2 >92%   - Telemetry & Continuous Pulse Ox   - albuterol INHALER PRN (avoid nebulization of secretions)   - Careful use of NiPPV, HFNC, IVF, and steroids if warranted    - Empiric antibiotics per likely source & patient allergies    - CAP: azithromycin & ceftriaxone - first dose 3/14 - discontinued on 3/15 as   no demonstration of consolidated airspace disease and ongoing stability                            on room air      Patient's chronic/stable medical conditions noted in the progress note above will be managed with the patient's home medications as tolerated.       Aidan Scherer MD  Department of Hospital Medicine  [facility]  (pager) [phone] (spect) [phone]

## 2020-03-16 NOTE — PLAN OF CARE
Pt is on contact and airborne isolation. Afebrile. VSS. On room air with oxygen sats in the mid 90s. On fall precautions. Bed alarms in use. Using urinal.    Island Pedicle Flap-Requiring Vessel Identification Text: The defect edges were debeveled with a #15 scalpel blade.  Given the location of the defect, shape of the defect and the proximity to free margins an island pedicle advancement flap was deemed most appropriate.  Using a sterile surgical marker, an appropriate advancement flap was drawn, based on the axial vessel mentioned above, incorporating the defect, outlining the appropriate donor tissue and placing the expected incisions within the relaxed skin tension lines where possible.    The area thus outlined was incised deep to adipose tissue with a #15 scalpel blade.  The skin margins were undermined to an appropriate distance in all directions around the primary defect and laterally outward around the island pedicle utilizing iris scissors.  There was minimal undermining beneath the pedicle flap.

## 2020-03-16 NOTE — EICU
Rounding (Video Assessment):  Yes    Intervention Initiated From:  COR / EICU    Gary Communicated with Bedside Nurse regarding:  Other    Nurse Notified:  No    Doctor Notified:  No    Comments: Rounds completed. Pt awake, watching TV w no distress noted

## 2020-03-16 NOTE — NURSING
VSS on RA. AOX4. Remains free of falls and injury.     Tolerating regular diet. Denies nausea and pain at this time. NSR noted to monitor. Pt voiding concentrated yellow urine per urinal independently.     No acute events. No distress noted. Bed in lowest position, call light within reach.

## 2020-03-16 NOTE — PLAN OF CARE
03/16/20 1001   Post-Acute Status   Post-Acute Authorization Placement   Post-Acute Placement Status Awaiting Internal Medical Clearance     JAZ advised by JAZ supervisor that this Pt may be ready as early as tomorrow. JAZ contacted Pt daughter Jennifer to discuss this. She reported she is quarantined at home after her exposure to them (both her parents). She has a  and two college aged kids at home and is worried about bringing him to a single family residence and trying to quarantine them both due his dementia dx. She reported Melita is not allowing people to leave. If she were able to stay with him until her mother is better then return home to her family to continue quarantine, she would do that, however, she is concerned about additional exposure to him given that he is Covid positive. She wants to know if he will still be an infection risk to others and if so, for how long? Pt daughter advised she is willing to work with the hospital on ideas for dc but cannot put the rest of her family at risk. JAZ relayed this to JAZ supervisor.    Tracey Starkey LCSW  Neurocritical Care   [facility]  [phone]

## 2020-03-16 NOTE — EICU
Rounding (Video Assessment):  Yes    Comments: 3rd Video rounding completed on patient. Sitting up in bed. Patient remains confused, but pleasant, no signs of respiratory distress. VSS. B/P 111/55 (77) P 74-SR, RR 20, Sa02 97% on RA. Will continue to monitor closely.

## 2020-03-16 NOTE — EICU
Rounding (Video Assessment):  Yes    Comments: Video rounding completed VSS B/P 102/55 (73) P- 71 SR, RR 24, Sa02 96% on Ra. Confused, unable to locate urinal in front of him. Call to Mary Jo, no answer spoke with FRANCK Riggs. Aide in room now, gave patient urinal. NAD noted. Will continue to monitor closely.

## 2020-03-16 NOTE — EICU
Rounding (Video Assessment):  Yes    Intervention Initiated From:  COR / EICU    Gary Communicated with Bedside Nurse regarding:  Other    Nurse Notified:  No    Doctor Notified:  No    Comments: Rounds completed.  Pt sleeping w no distress noted

## 2020-03-16 NOTE — EICU
Rounding (Video Assessment):  Yes  Comments: 2nd Video rounding completed with patient. Sitting up in bed sitting. NAD noted. VSS: B/P 101/56(76) P 81-SR, R10. Will continue to monitor closely.

## 2020-03-17 VITALS
TEMPERATURE: 98 F | BODY MASS INDEX: 21.19 KG/M2 | WEIGHT: 135 LBS | OXYGEN SATURATION: 94 % | SYSTOLIC BLOOD PRESSURE: 124 MMHG | HEART RATE: 94 BPM | RESPIRATION RATE: 18 BRPM | HEIGHT: 67 IN | DIASTOLIC BLOOD PRESSURE: 65 MMHG

## 2020-03-17 PROCEDURE — 94761 N-INVAS EAR/PLS OXIMETRY MLT: CPT

## 2020-03-17 PROCEDURE — 25000003 PHARM REV CODE 250: Performed by: INTERNAL MEDICINE

## 2020-03-17 PROCEDURE — G0378 HOSPITAL OBSERVATION PER HR: HCPCS

## 2020-03-17 RX ADMIN — ASPIRIN 81 MG: 81 TABLET, COATED ORAL at 09:03

## 2020-03-17 RX ADMIN — LEVETIRACETAM 250 MG: 250 TABLET ORAL at 09:03

## 2020-03-17 NOTE — EICU
Rounding (Video Assessment):  Yes    Intervention Initiated From:  COR / DULCE MARIA      Comments:

## 2020-03-17 NOTE — EICU
Rounding (Video Assessment):  Yes    Intervention Initiated From:  COR / EICU        Comments: video rounds completed.  Patient awake, soft wrist restraints noted.  VS:  88, 19, 124/65

## 2020-03-17 NOTE — EICU
Rounding (Video Assessment):  Yes    Intervention Initiated From:  COR / EICU        Comments: video rounds completed.  Patient awake.  VS:  71 SR, 26, 97%, 123/63

## 2020-03-17 NOTE — DISCHARGE INSTRUCTIONS
YOU HAVE TESTED POSITIVE FOR COVID-19 VIRUS    [facility]  Preventing the Spread of Coronavirus Disease 2019 in Homes and Residential Communities      Prevention steps for people with confirmed or suspected COVID-19 (including persons under investigation) who do not need to be hospitalized and people with confirmed COVID-19 who were hospitalized and determined to be medically stable to go home.    Your healthcare provider and public health staff will evaluate whether you can be cared for at home.   Stay home except to get medical care.   Separate yourself from other people and animals in your home   Call ahead before visiting your doctor.   Wear a facemask.   Cover your coughs and sneezes.   Clean your hands often.   Avoid sharing personal household items.   Clean all high-touch surfaces every day.   Monitor your symptoms. Seek prompt medical attention if your illness is worsening (e.g., difficulty breathing). Before seeking care, call your healthcare provider.   If you have a medical emergency and need to call 911, notify the dispatch personnel that you   have, or are being evaluated for COVID-19. If possible, put on a facemask before emergency   medical services arrive.   Discontinuing home isolation. Call your provider about guidance to discontinue home isolation.    Recommended precautions for household members, intimate partners, and caregivers in a nonhealthcare setting of a patient with symptomatic laboratory-confirmed COVID-19   Household members, intimate partners, and caregivers in a nonhealthcare setting may have close  contact with a person with symptomatic, laboratory-confirmed COVID-19 or a person under  investigation. Close contacts should monitor their health; they should call their healthcare provider right  away if they develop symptoms suggestive of COVID-19 (e.g., fever, cough, shortness of breath).    Close contacts should also follow these  recommendations:   Make sure that you understand and can help the patient follow their healthcare provider's instructions for medication(s) and care. You should help the patient with basic needs in the home and provide support for getting groceries, prescriptions, and other personal needs.   Monitor the patient's symptoms. If the patient is getting sicker, call his or her healthcare provider and tell them that the patient has laboratory-confirmed COVID-19. This will help the healthcare provider's office take steps to keep other people in the office or waiting room from getting infected. Ask the healthcare provider to call the local or [facility] health department for additional guidance. If the patient has a medical emergency and you need to call 911, notify the dispatch personnel that the patient has, or is being evaluated for COVID-19.   Household members should stay in another room or be  from the patient as much as possible. Household members should use a separate bedroom and bathroom, if available.   Prohibit visitors who do not have an essential need to be in the home.   Household members should care for any pets in the home. Do not handle pets or other animals while sick.   Make sure that shared spaces in the home have good air flow, such as by an air conditioner or an opened window, weather permitting.   Perform hand hygiene frequently. Wash your hands often with soap and water for at least 20 seconds or use an alcohol-based hand  that contains 60 to 95% alcohol, covering all surfaces of your hands and rubbing them together until they feel dry. Soap and water should be used preferentially if hands are visibly dirty.   Avoid touching your eyes, nose, and mouth with unwashed hands.   The patient should wear a facemask when you are around other people. If the patient is not able to wear a facemask (for example, because it causes trouble breathing), you, as the caregiver should wear a mask  when you are in the same room as the patient.   Wear a disposable facemask and gloves when you touch or have contact with the patient's blood, stool, or body fluids, such as saliva, sputum, nasal mucus, vomit, urine.  o Throw out disposable facemasks and gloves after using them. Do not reuse.  o When removing personal protective equipment, first remove and dispose of gloves. Then, immediately clean your hands with soap and water or alcohol-based hand . Next, remove and dispose of facemask, and immediately clean your hands again with soap and water or alcohol-based hand .   Avoid sharing household items with the patient. You should not share dishes, drinking glasses, cups, eating utensils, towels, bedding, or other items. After the patient uses these items, you should wash them thoroughly (see below Wash laundry thoroughly).   Clean all high-touch surfaces, such as counters, tabletops, doorknobs, bathroom fixtures, toilets, phones, keyboards, tablets, and bedside tables, every day. Also, clean any surfaces that may have blood, stool, or body fluids on them.   Use a household cleaning spray or wipe, according to the label instructions. Labels contain instructions for safe and effective use of the cleaning product including precautions you should take when applying the product, such as wearing gloves and making sure you have good ventilation during use of the product.   Wash laundry thoroughly.  o Immediately remove and wash clothes or bedding that have blood, stool, or body fluids on them.  o Wear disposable gloves while handling soiled items and keep soiled items away from your body. Clean your hands (with soap and water or an alcohol-based hand ) immediately after removing your gloves.  o Read and follow directions on labels of laundry or clothing items and detergent. In general, using a normal laundry detergent according to washing machine instructions and dry thoroughly using  the warmest temperatures recommended on the clothing label.   Place all used disposable gloves, facemasks, and other contaminated items in a lined container before disposing of them with other household waste. Clean your hands (with soap and water or an alcohol-based hand ) immediately after handling these items. Soap and water should be used preferentially if hands are visibly dirty.   Discuss any additional questions with your [facility] or local health department or healthcare provider. Check available hours when contacting your local health department.      -------------------------------------------------------------------------------------------------------------------------------------------        Instructions for Home Care of Patients and Caretakers with Coronavirus Disease 2019     Patients will be given one mask to take home with them if having symptoms of fever, cough, shortness of breath, and within 6 feet of others.   Limit visitors to the home.  Older persons and those that have chronic medical conditions such as diabetes, lung and heart disease are at increased risk for illness.    If possible, patients should use a separate bedroom while recovering. Caregivers and household members should avoid prolonged contact with the patient which means to stay 6 feet away and avoid contact with cough droplets.  When close contact is necessary, wash your hands before and immediately after contact.    Perform hand hygiene frequently. Wash your hands often with soap and water for at least 20 seconds or use an alcohol-based hand , covering all surfaces of your hands and rubbing them together until they feel dry.    Avoid touching your eyes, nose, and mouth with unwashed hands.   Avoid sharing household items with the patient. You should not share dishes, drinking glasses, cups, eating utensils, towels, bedding, or other items. After the patient uses these items, you should wash them  thoroughly.   Wash laundry thoroughly.   o Immediately remove and wash clothes or bedding that have blood, stool, or body fluids on them.   Clean all high-touch surfaces, such as counters, tabletops, doorknobs, bathroom fixtures, toilets, phones, keyboards, tablets, and bedside tables, every day.   o Use a household cleaning spray or wipe, according to the label instructions. Labels contain instructions for safe and effective use of the cleaning product including precautions you should take when applying the product, such as wearing gloves and making sure you have good ventilation during use of the product.    For more information see CDC link below.      https://www.cdc.gov/coronavirus/2019-ncov/hcp/guidance-prevent-spread.html#precautions

## 2020-03-17 NOTE — NURSING
Discharge instructions explained to pt caregiver, Jennifer. Verbalizes understanding. IV removed, catheter intact. Concerns voiced and answered.

## 2020-03-18 LAB
BACTERIA BLD CULT: NORMAL
L PNEUMO AG UR QL IA: NOT DETECTED

## 2020-03-18 NOTE — PLAN OF CARE
Patient discharged to home with daughter and wife.        03/18/20 1006   Final Note   Assessment Type Final Discharge Note   Anticipated Discharge Disposition Home   Hospital Follow Up  Appt(s) scheduled? No   Right Care Referral Info   Post Acute Recommendation No Care       Namita Montenegro RN, CCRN-K, [facility]  Neuro-Critical Care   [phone]

## 2020-03-30 NOTE — DISCHARGE SUMMARY
Discharge Summary  [facility]  [facility]      Attending Physician on Discharge: Aidan Scherer MD  Hospital Medicine Team: [facility] MED Z  Date of Admission:  3/13/2020     Date of Discharge:  3/17/2020    Active Hospital Problems    Diagnosis  POA    *COVID-19 virus infection [J22, B97.29]  Yes    Viral illness [B34.9]  Yes    Gait abnormality [R26.9]  Yes     2015: Developed gait disorder.      Ataxia [R27.0]  Yes    Coronary artery disease involving native coronary artery of native heart without angina pectoris [I25.10]  Yes     5/2003: [place]: CABG x 3.      Mitral valve disease [I05.9]  Yes     6/5/2012: Echo: Normal LV size and function. 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  6/19/2014: Echo: Mildly dilated LV with normal systolic function. Mild aortic valve sclerosis. Mild AR. Mild myxomatous mitral valve disease. Mild MR.      Hypercholesterolemia [E78.00]  Yes     2003: Began statin.        Resolved Hospital Problems   No resolved problems to display.        History of Present Illness:      Hospital Course:     Chris Vallecillo Jr. was admitted to [facility] for treatment of suspected COVID-19 viral infection and was treated with supportive care following a comprehensive physical, radiographic, and lab evaluation tailored to the current standard of care for COVID19. Please review the admission H&P and the studies listed below for details. She improved with supportive care and was found to be suitable for discharge home without oxygen.    Additional details of the hospitalization include: stable for entirety of hospital course; admitted with wife.  Chronic dementia     On the day of discharge, isolation precautions were reviewed at length verbally. The patient was also provided with written isolation guidelines modified from the [place] Department [facility] Health and Hospitals as well as the CDC, as part of discharge paperwork. An  updated phone number was obtained, which will be used to contact the patient when results are available, and positive patients will be enrolled in both the COVID-19 Home Symptom Monitoring program.    Laboratory Values:  No results found for: [id number]    No results for input(s): WBC, LYMPH, HGB, HCT, PLT in the last 168 hours.  No results for input(s): NA, K, CL, CO2, BUN, CREATININE, GLU, CALCIUM, MG, PHOS, LIPASE, AMYLASE in the last 168 hours.  No results for input(s): ALKPHOS, ALT, AST, ALBUMIN, PROT, BILITOT, INR in the last 168 hours.     No results for input(s): DDIMER, FERRITIN, CRP, LDH, BNP, TROPONINI, CPK in the last 72 hours.    Invalid input(s): PROCALCITONIN      Microbiology:  Microbiology Results (last 7 days)     Procedure Component Value Units Date/Time    Respiratory Infection Panel (PCR), Nasopharyngeal [[id number]] Collected:  03/13/20 1941    Order Status:  Completed Specimen:  Nasopharyngeal Swab Updated:  03/14/20 0659     Respiratory Infection Panel Source NP Swab     Adenovirus Not Detected     Coronavirus 229E, Common Cold Virus Not Detected     Coronavirus HKU1, Common Cold Virus Not Detected     Coronavirus NL63, Common Cold Virus Not Detected     Coronavirus OC43, Common Cold Virus Not Detected     Comment: The Coronavirus strains detected in this test cause the common cold.  These strains are not the COVID-19 (novel Coronavirus)strain   associated with the respiratory disease outbreak.          Human Metapneumovirus Not Detected     Human Rhinovirus/Enterovirus Not Detected     Influenza A (subtypes H1, H1-2009,H3) Not Detected     Influenza B Not Detected     Parainfluenza Virus 1 Not Detected     Parainfluenza Virus 2 Not Detected     Parainfluenza Virus 3 Not Detected     Parainfluenza Virus 4 Not Detected     Respiratory Syncytial Virus Not Detected     Bordetella Parapertussis (SF6635) Not Detected     Bordetella pertussis (ptxP) Not Detected     Chlamydia pneumoniae Not  Detected     Mycoplasma pneumoniae Not Detected     Comment: Respiratory Infection Panel testing performed by Multiplex PCR.       Narrative:       Nasal Swab Only - for all other respiratory sources, order  [id number] - Respiratory Viral Panel by PCR ([facility])    Influenza A & B by Molecular [[id number]] Collected:  03/13/20 1941    Order Status:  Completed Specimen:  Nasopharyngeal Swab Updated:  03/13/20 2033     Influenza A, Molecular Negative     Influenza B, Molecular Negative     Flu A & B Source Nasal swab    Culture, Respiratory with Gram Stain [[id number]]     Order Status:  Canceled Specimen:  Respiratory           Cardiac:  No results found for this or any previous visit.      Procedures:     Discharge Medication List as of 3/17/2020  5:27 PM      CONTINUE these medications which have NOT CHANGED    Details   aspirin (ECOTRIN) 81 MG EC tablet Take 1 tablet (81 mg total) by mouth once daily., Starting Tue 3/3/2020, Normal      atorvastatin (LIPITOR) 40 MG tablet TAKE 1 TABLET BY MOUTH EVERY DAY, Normal      levETIRAcetam (KEPPRA) 250 MG Tab Take 250 mg by mouth 2 (two) times daily., Historical Med      methylphenidate (RITALIN) 10 MG tablet Take 10 mg by mouth 2 (two) times daily., Starting Thu 5/11/2017, Historical Med      !! moxifloxacin (VIGAMOX) 0.5 % ophthalmic solution Place 1 drop into the left eye 4 (four) times daily., Starting 9/13/2016, Until Discontinued, Phone In      !! moxifloxacin (VIGAMOX) 0.5 % ophthalmic solution Place 1 drop into the right eye 4 (four) times daily., Starting 9/27/2016, Until Discontinued, No Print      penicillin v potassium (VEETID) 500 MG tablet Starting Thu 6/1/2017, Historical Med      !! prednisoLONE acetate (PRED FORTE) 1 % DrpS Place 1 drop into the left eye 4 (four) times daily., Starting 9/13/2016, Until Discontinued, Phone In      !! prednisoLONE acetate (PRED FORTE) 1 % DrpS Place 1 drop into the right eye 4 (four) times daily., Starting 9/27/2016, Until  Discontinued, No Print      vitamins  A,C,E-zinc-copper [phone] unit-mg-unit Cap Take by mouth., Until Discontinued, Historical Med       !! - Potential duplicate medications found. Please discuss with provider.            Discharge Diet:regular diet     Activity: Activity     Discharge Condition: Fair    Disposition: Home or Self Care    Follow up:    Tests pending at the time of discharge: none       Time spent  on the discharge of the patient including review of hospital course with the patient. reviewing discharge medications and arranging follow-up care: 35 mins    Discharge examination: Patient was seen and examined on the date of discharge and determined to be suitable for discharge.      Aidan Scherer MD  Department of Hospital Medicine  [facility]  (pager) [phone] (spect) [phone]

## 2020-04-03 ENCOUNTER — OUTPATIENT CASE MANAGEMENT (OUTPATIENT)
Dept: ADMINISTRATIVE | Facility: OTHER | Age: 85
End: 2020-04-03

## 2020-04-03 NOTE — PROGRESS NOTES
Outpatient Care Management  COVID-19 Patient Assessment    Patient: Chris Vallecillo Jr.  MRN: [id number]  Date of Service: 04/03/2020  Completed by: Vidya Haddad RN  Program: COVID-19    Reason for Visit   Patient presents with    COVID-19 Concerns     symptom review    OPCM Chart Review       Brief Summary:     Call to pt wife she indicates pt is resting at present and she indicates he is symptom free.  The were both diagnosed with covid 19 around the same time and were in the hospital together.  The both went on trip via air travel to [place] in march and returned to home at [facility] and began to have symptoms of flu after returning to apartment.  Discussed the covid 19 precautions and symptoms to report to md and she verbalized understanding.  She states  is with her at their daughters house and they are isolating on the first floor of her home.      Assessment Documentation     COVID-19 Assessment    Nurse Assessment via Chart Review:  Was patient on a ventilator while hospitalized?:  No  Nurse Assessment with Patient:  When did you test positive for COVID-19?:  3/13/20  Are you currently experiencing any of the following symptoms?:  None  For Fever:  When was the last time you took your temperature:  4/2/2020 10:50 AM  What was your last temperature reading?:  97.8 °F (36.6 °C)  Are you taking any medications to reduce your fever?:  No  For Coughing:  Are you taking any medications to treat your cough?:  No  On a scale of 1-10, how bad is your Cough?:  2  For Difficulty Breathing:  Are you taking any medications to help with your breathing?:  No  On a scale of 1-10 how difficult is it to breathe normally?:  1  Any medical equipment in use?:  No  On a normal day, what is your energy level?:  6  What is your current energy level?:  4  Psycho/Social Assessment:  Do you have a support person/caregiver?:  Yes  Are you and/or your caregiver able to access food?:  Yes  Are you and/or your caregiver  able to access medications?:  Yes  Are you able to isolate yourself from other family members?:  Yes  Is anyone else in your home ill with COVID-19?:  Yes  Does patient need any mental health/emotional support resources?:  No  Were you working prior to the COVID-19 illness?:  No  Are you still employed?:  No  Are you able to work from home?:  No  Are you experiencing financial difficulties related to the COVID-19 pandemic?:  No  Do you need access to Community Resources?:  No  COVID-19 Patient Assessment Complete (naga Yes only if assessment is finished):  Yes         Problem List and History     Patient Active Problem List   Diagnosis    Coronary artery disease involving native coronary artery of native heart without angina pectoris    History of coronary artery bypass surgery    Hypercholesterolemia    Mitral valve disease    Right bundle branch block    Other hammer toe (acquired)    Ataxia    Tremor    Gait abnormality    Viral illness    COVID-19 virus infection       Reviewed Active Problem List with patient and/or Caregiver. The following were identified as areas of need:     Medical History:  Reviewed medical history with patient and/or caregiver    Social History:  Reviewed social history with patient and/or caregiver    Complex Care Plan    Care plan was discussed and completed today with input from patient and/or caregiver.    Patient Instructions     Instructions were provided via the [email or web address] patient resources and are available for the patient to view on the patient portal, if active.    Next steps:     No follow-ups on file.    Todays OPCM Self-Management Care Plan was developed with the patients/caregivers input and was based on identified barriers from todays assessment.  Goals were written today with the patient/caregiver and the patient has agreed to work towards these goals to improve his/her overall well-being. Patient verbalized understanding of the care plan, goals, and all of  today's instructions. Encouraged patient/caregiver to communicate with his/her physician and health care team about health conditions and the treatment plan.  Provided my contact information today and encouraged patient/caregiver to call me with any questions as needed.

## 2020-09-03 ENCOUNTER — OFFICE VISIT (OUTPATIENT)
Dept: CARDIOLOGY | Facility: CLINIC | Age: 85
End: 2020-09-03
Attending: INTERNAL MEDICINE
Payer: MEDICARE

## 2020-09-03 VITALS
DIASTOLIC BLOOD PRESSURE: 64 MMHG | BODY MASS INDEX: 21.18 KG/M2 | HEIGHT: 67 IN | WEIGHT: 134.94 LBS | HEART RATE: 96 BPM | SYSTOLIC BLOOD PRESSURE: 104 MMHG

## 2020-09-03 DIAGNOSIS — R26.9 GAIT ABNORMALITY: ICD-10-CM

## 2020-09-03 DIAGNOSIS — I25.10 CORONARY ARTERY DISEASE INVOLVING NATIVE CORONARY ARTERY OF NATIVE HEART WITHOUT ANGINA PECTORIS: ICD-10-CM

## 2020-09-03 DIAGNOSIS — Z86.16 HISTORY OF SEVERE ACUTE RESPIRATORY SYNDROME CORONAVIRUS 2 (SARS-COV-2) DISEASE: ICD-10-CM

## 2020-09-03 DIAGNOSIS — I05.9 MITRAL VALVE DISEASE: ICD-10-CM

## 2020-09-03 DIAGNOSIS — Z95.1 HISTORY OF CORONARY ARTERY BYPASS SURGERY: ICD-10-CM

## 2020-09-03 DIAGNOSIS — I45.10 RIGHT BUNDLE BRANCH BLOCK: ICD-10-CM

## 2020-09-03 DIAGNOSIS — E78.00 HYPERCHOLESTEROLEMIA: ICD-10-CM

## 2020-09-03 PROCEDURE — 99214 OFFICE O/P EST MOD 30 MIN: CPT | Mod: S$PBB,25,, | Performed by: INTERNAL MEDICINE

## 2020-09-03 PROCEDURE — 93005 ELECTROCARDIOGRAM TRACING: CPT

## 2020-09-03 PROCEDURE — 93005 ELECTROCARDIOGRAM TRACING: CPT | Mod: PBBFAC | Performed by: INTERNAL MEDICINE

## 2020-09-03 PROCEDURE — 99213 OFFICE O/P EST LOW 20 MIN: CPT | Mod: PBBFAC,25 | Performed by: INTERNAL MEDICINE

## 2020-09-03 PROCEDURE — 93010 PR ELECTROCARDIOGRAM REPORT: ICD-10-PCS | Mod: S$PBB,,, | Performed by: INTERNAL MEDICINE

## 2020-09-03 PROCEDURE — 93010 ELECTROCARDIOGRAM REPORT: CPT | Mod: S$PBB,,, | Performed by: INTERNAL MEDICINE

## 2020-09-03 PROCEDURE — 99999 PR PBB SHADOW E&M-EST. PATIENT-LVL III: ICD-10-PCS | Mod: PBBFAC,,, | Performed by: INTERNAL MEDICINE

## 2020-09-03 PROCEDURE — 99214 PR OFFICE/OUTPT VISIT, EST, LEVL IV, 30-39 MIN: ICD-10-PCS | Mod: S$PBB,25,, | Performed by: INTERNAL MEDICINE

## 2020-09-03 PROCEDURE — 99999 PR PBB SHADOW E&M-EST. PATIENT-LVL III: CPT | Mod: PBBFAC,,, | Performed by: INTERNAL MEDICINE

## 2020-09-03 RX ORDER — ASPIRIN 81 MG/1
81 TABLET ORAL DAILY
Qty: 90 TABLET | Refills: 3 | Status: SHIPPED | OUTPATIENT
Start: 2020-09-03

## 2020-09-03 RX ORDER — ATORVASTATIN CALCIUM 40 MG/1
40 TABLET, FILM COATED ORAL DAILY
Qty: 90 TABLET | Refills: 3 | Status: SHIPPED | OUTPATIENT
Start: 2020-09-03

## 2020-09-03 NOTE — PROGRESS NOTES
Subjective:     Chris Vallecillo Jr. is a 85 y.o. male with hypercholesterolemia. He has known coronary artery disease in that he underwent coronary artery bypass surgery in 2003 in [place] while living in [place] receiving three grafts. He was on a ACE inhibitor but that was stopped due to a low blood pressure. 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 He has developed a movement disorder and was on Levodopa that he stopped as he was begun on methylphenidate and with that his gait got better. He was able to bike until 2019. In 3/2020 he was admitted to [facility] with a moderate course of COVID-19. No falls. No exertional chest pain or exertional dyspnea. No palpitations or weak spells. No bleeding. Feeling fair overall but remains weak.      Coronary Artery Disease  Presents for follow-up visit. The disease course has been stable. Symptoms include muscle weakness. Pertinent negatives include no chest pain, chest pressure, chest tightness, dizziness, leg swelling, palpitations, shortness of breath or weight gain. Risk factors include hyperlipidemia. Risk factors do not include decreased physical activity, diabetes, hypertension or obesity. The symptoms have been stable.   Hyperlipidemia  This is a chronic problem. The current episode started more than 1 year ago. The problem is controlled. Recent lipid tests were reviewed and are normal. He has no history of chronic renal disease, diabetes, hypothyroidism, liver disease, obesity or nephrotic syndrome. Pertinent negatives include no chest pain, focal sensory loss, focal weakness, leg pain, myalgias or shortness of breath.       Review of Systems   Constitution: Negative for fever and weight gain.   HENT: Negative for nosebleeds.    Eyes: Negative for pain, vision loss in left eye and vision loss in right eye.   Cardiovascular: Negative for chest pain,  claudication, dyspnea on exertion, irregular heartbeat, leg swelling, near-syncope, orthopnea, palpitations, paroxysmal nocturnal dyspnea and syncope.   Respiratory: Negative for chest tightness, cough, hemoptysis, shortness of breath and wheezing.    Endocrine: Negative for cold intolerance and heat intolerance.   Hematologic/Lymphatic: Negative for bleeding problem. Does not bruise/bleed easily.   Skin: Negative for color change and rash.   Musculoskeletal: Positive for muscle weakness. Negative for arthritis, back pain, falls and myalgias.   Gastrointestinal: Negative for heartburn, hematemesis, hematochezia, hemorrhoids, jaundice, melena, nausea and vomiting.   Genitourinary: Negative for dysuria and hematuria.   Neurological: Positive for disturbances in coordination. Negative for excessive daytime sleepiness, dizziness, focal weakness, headaches, light-headedness, loss of balance, numbness and vertigo.   Psychiatric/Behavioral: Negative for altered mental status, depression and memory loss. The patient is not nervous/anxious.    Allergic/Immunologic: Negative for hives and persistent infections.       Current Outpatient Medications on File Prior to Visit   Medication Sig Dispense Refill    aspirin (ECOTRIN) 81 MG EC tablet Take 1 tablet (81 mg total) by mouth once daily. 90 tablet 3    atorvastatin (LIPITOR) 40 MG tablet TAKE 1 TABLET BY MOUTH EVERY DAY 90 tablet 3    levETIRAcetam (KEPPRA) 250 MG Tab Take 250 mg by mouth 2 (two) times daily.      methylphenidate (RITALIN) 10 MG tablet Take 10 mg by mouth 2 (two) times daily.      moxifloxacin (VIGAMOX) 0.5 % ophthalmic solution Place 1 drop into the left eye 4 (four) times daily. 0.0667 mL 0    moxifloxacin (VIGAMOX) 0.5 % ophthalmic solution Place 1 drop into the right eye 4 (four) times daily. 0.0667 mL 0    penicillin v potassium (VEETID) 500 MG tablet       prednisoLONE acetate (PRED FORTE) 1 % DrpS Place 1 drop into the left eye 4 (four) times  "daily. 1 Bottle 0    prednisoLONE acetate (PRED FORTE) 1 % DrpS Place 1 drop into the right eye 4 (four) times daily. 1 Bottle 1    vitamins  A,C,E-zinc-copper [id number] unit-mg-unit Cap Take by mouth.       No current facility-administered medications on file prior to visit.        /64 (BP Location: Left arm, Patient Position: Sitting, BP Method: Large (Automatic))   Pulse 96   Ht 5' 7" (1.702 m)   Wt 61.2 kg (134 lb 14.7 oz)   BMI 21.13 kg/m²       Objective:     Physical Exam   Constitutional: He appears well-developed and well-nourished.  Non-toxic appearance. He does not appear ill. No distress.   HENT:   Head: Normocephalic and atraumatic.   Nose: Nose normal.   Eyes: Right eye exhibits no discharge. Left eye exhibits no discharge. Right conjunctiva is not injected. Left conjunctiva is not injected. Right pupil is round. Left pupil is round. Pupils are equal.   Neck: Neck supple. No JVD present. Carotid bruit is not present. No thyroid mass and no thyromegaly present.   Cardiovascular: Normal rate, regular rhythm, S1 normal and S2 normal. PMI is not displaced. Exam reveals no gallop.   Murmur heard.  High-pitched blowing holosystolic murmur is present with a grade of 3/6 at the apex.  Pulses:       Radial pulses are 2+ on the right side and 2+ on the left side.        Dorsalis pedis pulses are 2+ on the right side and 2+ on the left side.        Posterior tibial pulses are 2+ on the right side and 2+ on the left side.   Pulmonary/Chest: Effort normal and breath sounds normal.   Median sternotomy scar.   Abdominal: Soft. Normal appearance. There is no hepatosplenomegaly. There is no abdominal tenderness.   Musculoskeletal:      Right ankle: He exhibits no swelling, no ecchymosis and no deformity.      Left ankle: He exhibits no swelling.   Lymphadenopathy:        Head (right side): No submandibular adenopathy present.        Head (left side): No submandibular adenopathy present.     He has no " cervical adenopathy.   Neurological: He is alert. He is not disoriented. No cranial nerve deficit. Gait abnormal.   Skin: Skin is warm, dry and intact. He is not diaphoretic.   Psychiatric: He has a normal mood and affect. His behavior is normal. Judgment and thought content normal. His speech is delayed. Cognition and memory are normal.       Assessment:      1. Coronary artery disease involving native coronary artery of native heart without angina pectoris    2. History of coronary artery bypass surgery    3. Mitral valve disease    4. Right bundle branch block    5. Hypercholesterolemia    6. Gait abnormality    7. History of severe acute respiratory syndrome coronavirus 2 (SARS-CoV-2) disease        Plan:     1. Coronary Artery Disease   5/2003: [place]: CABG x 3.   2/2014: Episode of chest pain that sounded atypical.   On aspirin 81 mg Q24.   Stable.    2. Mitral Valve Disease   6/5/2012: Echo: Normal LV size and function. 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   6/19/2014: Echo: Mildly dilated LV with normal systolic function. Mild aortic valve sclerosis. Mild AR. Mild myxomatous mitral valve disease. Mild MR.   Well compensated.   No need for f/u.    3. Right Bundle Branch Block   3/11/2015: Noted.    4. Hypercholesterolemia   2003: Began statin.   On atorvastatin 40 mg Q24.   3/13/2013: LDL 80.   9/15/2017: Chol 169. HDL 70. LDL 90. TG 47.   On atorvastatin 40 mg Q24.   Well controlled.    5. Movement Disorder   2015: Developed gait disorder.   On methylphenidate 10 mg Q24 and levetiracetam 250 mg Q12.   At least initially was much improved.    6. History of Severe Acute Respiratory Syndrome - Corona Virus 2 Infection   3/14/2020: Moderate course.    7. Primary Care   [facility].    F/u 6 months.    Brijesh Cole M.D.

## 2020-12-30 ENCOUNTER — TELEPHONE (OUTPATIENT)
Dept: SPEECH THERAPY | Facility: HOSPITAL | Age: 85
End: 2020-12-30

## 2021-01-05 ENCOUNTER — HOSPITAL ENCOUNTER (OUTPATIENT)
Dept: RADIOLOGY | Facility: OTHER | Age: 86
Discharge: HOME | End: 2021-01-05
Attending: INTERNAL MEDICINE
Payer: MEDICARE

## 2021-01-05 DIAGNOSIS — R13.12 DYSPHAGIA, OROPHARYNGEAL PHASE: ICD-10-CM

## 2021-01-05 DIAGNOSIS — R05.9 COUGH: ICD-10-CM

## 2021-01-05 PROCEDURE — A9698 NON-RAD CONTRAST MATERIALNOC: HCPCS | Performed by: INTERNAL MEDICINE

## 2021-01-05 PROCEDURE — 74230 X-RAY XM SWLNG FUNCJ C+: CPT | Mod: TC

## 2021-01-05 PROCEDURE — 74230 FL MODIFIED BARIUM SWALLOW SPEECH STUDY: ICD-10-PCS | Mod: 26,,, | Performed by: RADIOLOGY

## 2021-01-05 PROCEDURE — 74230 X-RAY XM SWLNG FUNCJ C+: CPT | Mod: 26,,, | Performed by: RADIOLOGY

## 2021-01-05 PROCEDURE — 25500020 PHARM REV CODE 255: Performed by: INTERNAL MEDICINE

## 2021-01-05 PROCEDURE — 92611 MOTION FLUOROSCOPY/SWALLOW: CPT

## 2021-01-05 RX ADMIN — BARIUM SULFATE 50 ML: 0.81 POWDER, FOR SUSPENSION ORAL at 11:01

## 2021-04-28 ENCOUNTER — PATIENT MESSAGE (OUTPATIENT)
Dept: RESEARCH | Facility: HOSPITAL | Age: 86
End: 2021-04-28

## 2022-09-06 NOTE — DISCHARGE INSTRUCTIONS
All of your lab tests and chest x-ray here in the emergency department are negative.  Because you may have been exposed to the corona virus, you will need to self quarantine.  I have attached a Flyer about what this means.  Be careful to avoid others as much as possible, wear a mask around others, wash your hands frequently.    Please schedule a follow-up appointment with your primary care doctor.  If you start to have fever or shortness of breath please return to the emergency department.  If your corona virus test comes back positive you will be contacted.   Initial Size Of Lesion: 0.7